# Patient Record
Sex: MALE | Race: BLACK OR AFRICAN AMERICAN | NOT HISPANIC OR LATINO | Employment: UNEMPLOYED | URBAN - METROPOLITAN AREA
[De-identification: names, ages, dates, MRNs, and addresses within clinical notes are randomized per-mention and may not be internally consistent; named-entity substitution may affect disease eponyms.]

---

## 2018-05-02 ENCOUNTER — HOSPITAL ENCOUNTER (OUTPATIENT)
Dept: MRI IMAGING | Facility: CLINIC | Age: 78
Discharge: HOME | End: 2018-05-02
Attending: OTOLARYNGOLOGY | Admitting: OTOLARYNGOLOGY
Payer: COMMERCIAL

## 2018-05-02 DIAGNOSIS — H90.3 ASNHL (ASYMMETRICAL SENSORINEURAL HEARING LOSS): ICD-10-CM

## 2018-05-02 PROCEDURE — A9585 GADOBUTROL INJECTION: HCPCS | Performed by: OTOLARYNGOLOGY

## 2018-05-02 PROCEDURE — 70553 MRI BRAIN STEM W/O & W/DYE: CPT

## 2018-05-02 PROCEDURE — 25000128 H RX IP 250 OP 636: Performed by: OTOLARYNGOLOGY

## 2018-05-02 RX ORDER — GADOBUTROL 604.72 MG/ML
10 INJECTION INTRAVENOUS ONCE
Status: COMPLETED | OUTPATIENT
Start: 2018-05-02 | End: 2018-05-02

## 2018-05-02 RX ADMIN — GADOBUTROL 8.5 ML: 604.72 INJECTION INTRAVENOUS at 14:31

## 2020-03-14 ENCOUNTER — HOSPITAL ENCOUNTER (EMERGENCY)
Facility: CLINIC | Age: 80
Discharge: HOME | End: 2020-03-14
Attending: EMERGENCY MEDICINE | Admitting: EMERGENCY MEDICINE
Payer: COMMERCIAL

## 2020-03-14 ENCOUNTER — APPOINTMENT (OUTPATIENT)
Dept: CT IMAGING | Facility: CLINIC | Age: 80
End: 2020-03-14
Attending: EMERGENCY MEDICINE
Payer: COMMERCIAL

## 2020-03-14 VITALS
RESPIRATION RATE: 16 BRPM | DIASTOLIC BLOOD PRESSURE: 80 MMHG | OXYGEN SATURATION: 100 % | SYSTOLIC BLOOD PRESSURE: 145 MMHG | HEART RATE: 78 BPM | TEMPERATURE: 97.9 F | WEIGHT: 185 LBS

## 2020-03-14 DIAGNOSIS — L03.213 PRESEPTAL CELLULITIS OF RIGHT LOWER EYELID: ICD-10-CM

## 2020-03-14 DIAGNOSIS — H10.31 ACUTE CONJUNCTIVITIS OF RIGHT EYE, UNSPECIFIED ACUTE CONJUNCTIVITIS TYPE: ICD-10-CM

## 2020-03-14 LAB
CREAT SERPL-MCNC: 0.88 MG/DL (ref 0.66–1.25)
GFR SERPL CREATININE-BSD FRML MDRD: 82 ML/MIN/{1.73_M2}

## 2020-03-14 PROCEDURE — 25000125 ZZHC RX 250: Performed by: EMERGENCY MEDICINE

## 2020-03-14 PROCEDURE — 99285 EMERGENCY DEPT VISIT HI MDM: CPT | Mod: 25 | Performed by: EMERGENCY MEDICINE

## 2020-03-14 PROCEDURE — 99284 EMERGENCY DEPT VISIT MOD MDM: CPT | Mod: Z6 | Performed by: EMERGENCY MEDICINE

## 2020-03-14 PROCEDURE — 25000128 H RX IP 250 OP 636: Performed by: EMERGENCY MEDICINE

## 2020-03-14 PROCEDURE — 70487 CT MAXILLOFACIAL W/DYE: CPT

## 2020-03-14 PROCEDURE — 82565 ASSAY OF CREATININE: CPT | Performed by: EMERGENCY MEDICINE

## 2020-03-14 RX ORDER — ERYTHROMYCIN 5 MG/G
0.5 OINTMENT OPHTHALMIC 4 TIMES DAILY
Qty: 1 TUBE | Refills: 0 | Status: SHIPPED | OUTPATIENT
Start: 2020-03-14 | End: 2020-03-21

## 2020-03-14 RX ORDER — PROPARACAINE HYDROCHLORIDE 5 MG/ML
2 SOLUTION/ DROPS OPHTHALMIC ONCE
Status: COMPLETED | OUTPATIENT
Start: 2020-03-14 | End: 2020-03-14

## 2020-03-14 RX ORDER — IOPAMIDOL 755 MG/ML
100 INJECTION, SOLUTION INTRAVASCULAR ONCE
Status: COMPLETED | OUTPATIENT
Start: 2020-03-14 | End: 2020-03-14

## 2020-03-14 RX ADMIN — PROPARACAINE HYDROCHLORIDE 2 DROP: 5 SOLUTION/ DROPS OPHTHALMIC at 19:03

## 2020-03-14 RX ADMIN — SODIUM CHLORIDE 50 ML: 9 INJECTION, SOLUTION INTRAVENOUS at 20:35

## 2020-03-14 RX ADMIN — FLUORESCEIN SODIUM 1 STRIP: 1 STRIP OPHTHALMIC at 19:03

## 2020-03-14 RX ADMIN — IOPAMIDOL 80 ML: 755 INJECTION, SOLUTION INTRAVENOUS at 20:34

## 2020-03-14 ASSESSMENT — TONOMETRY
OD_IOP_MMHG: 13
OS_IOP_MMHG: 13

## 2020-03-14 NOTE — ED AVS SNAPSHOT
[facility], [place], Emergency Department  [address]  [facility] [place] [facility] [place] [place] [address]  Phone:  [phone]  Fax:  [phone]                                    Yenny Reynolds   MRN: [id number]    Department:  [facility], Emergency Department   Date of Visit:  3/14/2020           After Visit Summary Signature Page    I have received my discharge instructions, and my questions have been answered. I have discussed any challenges I see with this plan with the nurse or doctor.    ..........................................................................................................................................  Patient/Patient Representative Signature      ..........................................................................................................................................  Patient Representative Print Name and Relationship to Patient    ..................................................               ................................................  Date                                   Time    ..........................................................................................................................................  Reviewed by Signature/Title    ...................................................              ..............................................  Date                                               Time          22EPIC Rev 08/18

## 2020-03-14 NOTE — ED PROVIDER NOTES
"    [facility] EMERGENCY DEPARTMENT ([facility])     March 14, 2020    ED Provider Note  [facility]      History     Chief Complaint   Patient presents with     Facial Swelling     pain and swelling to right lower eye lid, reports drainage from lower lid, blurry vision, eye is reddened, denies globe pain     HPI Due to language barrier, an  was used during the history-taking and subsequent discussion (and for part of the physical exam) with this patient.      Yenny Reynolds is a 80 year old male who presents with eye redness and discharge. Patient reports he noticed a \"pimple\" under his right eye. Swelling worsened with increased redness in the right eye. Some yellow drainage. Also notes worsening vision in right eye. No trauma to the eye. Does not wear corrective lenses. No other systemic symptoms. Seen by PCP today who sent him to the ED for further management.     Past Medical History  Past Medical History:   Diagnosis Date     Diabetes (H)      Hyperlipidaemia      Hypertension      Past Surgical History:   Procedure Laterality Date     ESOPHAGOSCOPY, GASTROSCOPY, DUODENOSCOPY (EGD), COMBINED N/A 7/10/2015    Procedure: COMBINED ESOPHAGOSCOPY, GASTROSCOPY, DUODENOSCOPY (EGD), BIOPSY SINGLE OR MULTIPLE;  Surgeon: Anthony Jack MD;  Location: [facility]     NO HISTORY OF SURGERY       amoxicillin-clavulanate (AUGMENTIN) 875-125 MG tablet  erythromycin (ROMYCIN) 5 MG/GM ophthalmic ointment  ASPIRIN ADULT LOW STRENGTH PO  omeprazole (PRILOSEC) 40 MG capsule      No Known Allergies  Past medical history, past surgical history, medications, and allergies were reviewed with the patient. Additional pertinent items: None    Family History  History reviewed. No pertinent family history.  Family history was reviewed with the patient. Additional pertinent items: None    Social History  Social History     Tobacco Use     Smoking status: Current Every Day Smoker     Packs/day: 0.50 "     Types: Cigarettes     Smokeless tobacco: Never Used   Substance Use Topics     Alcohol use: No     Drug use: No      Social history was reviewed with the patient. Additional pertinent items: None    Review of Systems  A complete review of systems was performed with pertinent positives and negatives noted in the HPI, and all other systems negative.    Physical Exam   BP: (!) 158/83  Pulse: 76  Temp: 97.9  F (36.6  C)  Resp: 16  Weight: 83.9 kg (185 lb)  SpO2: 98 %  Physical Exam  Vitals signs and nursing note reviewed.   Constitutional:       General: He is not in acute distress.     Appearance: Normal appearance. He is not diaphoretic.   HENT:      Head: Normocephalic and atraumatic.      Right Ear: Tympanic membrane normal.      Left Ear: Tympanic membrane normal.      Nose: Nose normal. No congestion or rhinorrhea.      Mouth/Throat:      Mouth: Mucous membranes are moist.      Pharynx: Oropharynx is clear. No oropharyngeal exudate or posterior oropharyngeal erythema.   Eyes:      General: No visual field deficit or scleral icterus.        Right eye: Discharge (yellow-tim) and hordeolum (with significant swelling of the lower lid) present.      Intraocular pressure: Right eye pressure is 13 mmHg. Left eye pressure is 13 mmHg.      Extraocular Movements: Extraocular movements intact.      Conjunctiva/sclera:      Right eye: Right conjunctiva is injected.      Pupils: Pupils are equal, round, and reactive to light.      Right eye: No corneal abrasion or fluorescein uptake.      Left eye: No corneal abrasion or fluorescein uptake.      Comments: No tenderness to palpation of the globe. No proptosis.   Neck:      Musculoskeletal: Neck supple.   Cardiovascular:      Heart sounds: Normal heart sounds.   Pulmonary:      Effort: No respiratory distress.      Breath sounds: Normal breath sounds.   Abdominal:      General: Bowel sounds are normal.      Palpations: Abdomen is soft.      Tenderness: There is no abdominal  tenderness.   Musculoskeletal:         General: No tenderness.   Skin:     General: Skin is warm.      Findings: No rash.   Neurological:      Mental Status: He is alert.         ED Course      Procedures       Results for orders placed or performed during the hospital encounter of 03/14/20   CT Facial Bones with Contrast     Status: None    Narrative    CT SCAN OF THE FACE WITH CONTRAST 3/14/2020 8:52 PM     HISTORY: Eye and facial swelling. Evaluate for sinus and orbital  infection    TECHNIQUE:  Axial scans with sagittal and coronal reformations.  Radiation dose for this scan was reduced using automated exposure  control, adjustment of the mA and/or kV according to patient size, or  iterative reconstruction technique. 80 ml Isovue 370    COMPARISON: None.    FINDINGS: There is preseptal soft tissue swelling over the right  orbit. This extends in over the right cheek region. There is a small,  4 mm low dense fluid like collection in the inferior eyelid. This is  best seen on sagittal image 27. This may represent a small abscess.  Post septal structures appear normal. No evidence for any post septal  orbital abscess. The extraocular muscles are normal. The optic nerve  appears normal. The cavernous sinuses appear normal. Mild mucosal  thickening is seen in the frontal sinuses, ethmoid sinuses, and  maxillary sinuses.      Impression    IMPRESSION:  1. Preseptal soft tissue swelling over the right orbit.  2. Low dense fluid like collection in the lower eyelid. This could  represent a small abscess.  3. Mucosal thickening is present in the maxillary sinuses, frontal  sinuses, ethmoid sinuses, and sphenoid sinuses. No air-fluid levels  are seen.    STAR LAZARO MD   Creatinine     Status: None   Result Value Ref Range    Creatinine 0.88 0.66 - 1.25 mg/dL    GFR Estimate 82 >60 mL/min/[1.73_m2]    GFR Estimate If Black >90 >60 mL/min/[1.73_m2]     Medications   proparacaine (ALCAINE) 0.5 % ophthalmic solution 2 drop (2  drops Both Eyes Given by Other 3/14/20 1903)   fluorescein (FUL-YOVANNY) ophthalmic strip 1 strip (1 strip Right Eye Given by Other 3/14/20 1903)   iopamidol (ISOVUE-370) solution 100 mL (80 mLs Intravenous Given 3/14/20 2034)   sodium chloride 0.9 % bag 500mL for CT scan flush use (50 mLs Intravenous Given 3/14/20 2035)        Assessments & Plan (with Medical Decision Making)   Patient was seen and examined. Findings consistent with right lower eyelid stye, though there is concern for associated cellulitis. I did discuss the case with ophthalmology who evaluated the patient in the emergency department. Given significant vision changes in the right eye, recommended CT scan to rule out orbital cellulitis. This showed pre-septal cellulitis without any abnormality of the eye or post-septal area. Will discharge patient on Augmentin and erythromycin ophthalmic ointment. Close outpatient follow up with ophthalmology (they will call him Monday). Patient expressed understanding and agreement of plan. Discharged in stable condition.     I have reviewed the nursing notes. I have reviewed the findings, diagnosis, plan and need for follow up with the patient.    Discharge Medication List as of 3/14/2020  9:06 PM      START taking these medications    Details   amoxicillin-clavulanate (AUGMENTIN) 875-125 MG tablet Take 1 tablet by mouth 2 times daily, Disp-28 tablet,R-0, Local Print      erythromycin (ROMYCIN) 5 MG/GM ophthalmic ointment Place 0.5 inches into the right eye 4 times daily for 7 daysDisp-1 Tube,R-0Local Print             Final diagnoses:   Preseptal cellulitis of right lower eyelid   Acute conjunctivitis of right eye, unspecified acute conjunctivitis type       --  Leti Santiago DO   Emergency Medicine   [facility], [place], EMERGENCY DEPARTMENT  3/14/2020       Leti Santiago DO  03/14/20 [id number]

## 2020-03-15 NOTE — DISCHARGE INSTRUCTIONS
Please make an appointment to follow up with ophthalmology. They will call you on Monday to make an appointment. Return to emergency department sooner if symptoms become worse.

## 2020-03-15 NOTE — CONSULTS
"  OPHTHALMOLOGY CONSULT NOTE  03/14/20    Patient: Yenny Reynolds  Consulted by: ED  Reason for Consult: cellulitis    HISTORY OF PRESENTING ILLNESS:     Yenny Reynolds is a 80 year old male who presented with redness in the right eye and firmness under the right lower eyelid for the past week. Pt reports he initially developed a pimple on the lower eyelid which popped and the discharge went into his eye. Since then, he reports blurry vision. Pt denies pain with extraocular movement, diplopia. Per pt, his vision in right eye has always been worse than left eye but it has worsened since the \"pimple\" popped. He does not wear glasses or contacts. Pt denies any ocular hx. He has DM but states his sugars are under control.     Review of systems were otherwise negative except for that which has been stated above.      OCULAR/MEDICAL/SURGICAL HISTORIES:     Past Ocular History:  none    Past Medical History:   Diagnosis Date     Diabetes (H)      Hyperlipidaemia      Hypertension        Past Surgical History:   Procedure Laterality Date     ESOPHAGOSCOPY, GASTROSCOPY, DUODENOSCOPY (EGD), COMBINED N/A 7/10/2015    Procedure: COMBINED ESOPHAGOSCOPY, GASTROSCOPY, DUODENOSCOPY (EGD), BIOPSY SINGLE OR MULTIPLE;  Surgeon: Anthony Jack MD;  Location:  GI     NO HISTORY OF SURGERY         EXAMINATION:     Base Eye Exam     Visual Acuity       Right Left    Near sc 20/200 20/25          Tonometry (Tonopen, 6:56 PM)       Right Left    Pressure 12 13          Pupils       Pupils    Right PERRL    Left PERRL          Visual Fields       Left Right     Full Full          Extraocular Movement       Right Left     Full Full          Neuro/Psych     Oriented x3:  Yes    Mood/Affect:  Normal          Dilation     Both eyes:  1.0% Mydriacyl, 2.5% Mino Synephrine @ 6:56 PM            Slit Lamp and Fundus Exam     External Exam       Right Left    External edema and firm mass around the lower eyelid Normal          Slit Lamp Exam       " Right Left    Lids/Lashes lower eyelid erythema, edema, firm mass, , Chalazion Normal    Conjunctiva/Sclera sub conj hemmorhage nasally and inferiorly White and quiet    Cornea Clear Clear    Anterior Chamber Deep and quiet Deep and quiet    Iris Round and reactive Round and reactive    Lens 2-3+ PSC, 2+ NS 2+ NS    Vitreous Normal Normal          Fundus Exam       Right Left    Disc Normal Normal    C/D Ratio 0.75 0.65    Macula Normal, hazy view Normal    Vessels Normal, hazy view Normal    Periphery Normal, hazy view secondary to cataract Normal                Labs/Studies/Imaging Performed  none     ASSESSMENT/PLAN:     Yenny Reynolds is a 80 year old male who presents with chalazion and preseptal cellulitis  - given significant difference in vision between 2 eyes, cannot rule out post-septal cellulitis  - however, vision changes could be secondary to denser cataract in right eye than in left eye  - pt reports no pain with extraocular movements and there is no restriction with extraocular movement    Recommend CT orbit and facial bones to rule out post-septal cellulitis  - If preseptal cellulitis, recommend oral abx, warm compresses and erythromycin ointment TID in right eye. Follow up in clinic early next week  - if post septal cellulitis, pt will need admission to the hospital for IV abx      #Cataracts  - visually significant cataract (R>L)  - follow up as outpatient    It is our pleasure to participate in this patient's care and treatment. Please contact us with any further questions or concerns.    Discussed with Dr. Spike Rivera MD  Ophthalmology Resident, PGY-2

## 2020-03-16 ENCOUNTER — TELEPHONE (OUTPATIENT)
Dept: OPHTHALMOLOGY | Facility: CLINIC | Age: 80
End: 2020-03-16

## 2021-06-16 ENCOUNTER — MEDICAL CORRESPONDENCE (OUTPATIENT)
Dept: HEALTH INFORMATION MANAGEMENT | Facility: CLINIC | Age: 81
End: 2021-06-16

## 2021-06-30 ENCOUNTER — TRANSCRIBE ORDERS (OUTPATIENT)
Dept: OTHER | Age: 81
End: 2021-06-30

## 2021-06-30 DIAGNOSIS — E11.9 TYPE 2 DIABETES MELLITUS WITHOUT COMPLICATION, WITHOUT LONG-TERM CURRENT USE OF INSULIN (H): Primary | ICD-10-CM

## 2021-07-05 NOTE — TELEPHONE ENCOUNTER
FUTURE VISIT INFORMATION      FUTURE VISIT INFORMATION:    Date: 8/2/21    Time: 10:20a m    Location: [facility]  REFERRAL INFORMATION:    Referring provider:  Lisa Max     Referring providers clinic:  [facility]  Reason for visit/diagnosis  diabetic eye exam  RECORDS REQUESTED FROM:       Clinic name Comments Records Status Imaging Status   [facility] Ov/referral 6/16/21 Care Everywhere    [facility] eye Ov 3/14/20 EPIC

## 2021-08-02 ENCOUNTER — PRE VISIT (OUTPATIENT)
Dept: OPHTHALMOLOGY | Facility: CLINIC | Age: 81
End: 2021-08-02

## 2022-10-04 ENCOUNTER — TRANSFERRED RECORDS (OUTPATIENT)
Dept: HEALTH INFORMATION MANAGEMENT | Facility: CLINIC | Age: 82
End: 2022-10-04

## 2022-10-26 ENCOUNTER — LAB REQUISITION (OUTPATIENT)
Dept: LAB | Facility: CLINIC | Age: 82
End: 2022-10-26
Payer: COMMERCIAL

## 2022-10-26 DIAGNOSIS — R05.9 COUGH, UNSPECIFIED: ICD-10-CM

## 2022-10-31 LAB
SARS-COV-2 RNA RESP QL NAA+PROBE: NEGATIVE
SCANNED LAB RESULT: NORMAL

## 2022-11-02 ENCOUNTER — LAB REQUISITION (OUTPATIENT)
Dept: LAB | Facility: CLINIC | Age: 82
End: 2022-11-02
Payer: COMMERCIAL

## 2022-11-02 DIAGNOSIS — E11.9 TYPE 2 DIABETES MELLITUS WITHOUT COMPLICATIONS (H): ICD-10-CM

## 2022-11-02 DIAGNOSIS — D50.9 IRON DEFICIENCY ANEMIA, UNSPECIFIED: ICD-10-CM

## 2022-11-02 LAB
ALBUMIN SERPL BCG-MCNC: 3.9 G/DL (ref 3.5–5.2)
ALP SERPL-CCNC: 114 U/L (ref 40–129)
ALT SERPL W P-5'-P-CCNC: 17 U/L (ref 10–50)
ANION GAP SERPL CALCULATED.3IONS-SCNC: 14 MMOL/L (ref 7–15)
AST SERPL W P-5'-P-CCNC: 19 U/L (ref 10–50)
BILIRUB SERPL-MCNC: 0.2 MG/DL
BUN SERPL-MCNC: 12.3 MG/DL (ref 8–23)
CALCIUM SERPL-MCNC: 9.3 MG/DL (ref 8.8–10.2)
CHLORIDE SERPL-SCNC: 99 MMOL/L (ref 98–107)
CHOLEST SERPL-MCNC: 195 MG/DL
CREAT SERPL-MCNC: 0.81 MG/DL (ref 0.67–1.17)
DEPRECATED HCO3 PLAS-SCNC: 20 MMOL/L (ref 22–29)
FERRITIN SERPL-MCNC: 10 NG/ML (ref 31–409)
GFR SERPL CREATININE-BSD FRML MDRD: 89 ML/MIN/1.73M2
GLUCOSE SERPL-MCNC: 274 MG/DL (ref 70–99)
HDLC SERPL-MCNC: 44 MG/DL
LDLC SERPL CALC-MCNC: 92 MG/DL
NONHDLC SERPL-MCNC: 151 MG/DL
POTASSIUM SERPL-SCNC: 4.1 MMOL/L (ref 3.4–5.3)
PROT SERPL-MCNC: 7.3 G/DL (ref 6.4–8.3)
SODIUM SERPL-SCNC: 133 MMOL/L (ref 136–145)
TRIGL SERPL-MCNC: 293 MG/DL

## 2022-11-02 PROCEDURE — 82043 UR ALBUMIN QUANTITATIVE: CPT | Mod: ORL | Performed by: INTERNAL MEDICINE

## 2022-11-02 PROCEDURE — 82728 ASSAY OF FERRITIN: CPT | Mod: ORL | Performed by: INTERNAL MEDICINE

## 2022-11-02 PROCEDURE — 80053 COMPREHEN METABOLIC PANEL: CPT | Mod: ORL | Performed by: INTERNAL MEDICINE

## 2022-11-02 PROCEDURE — 80061 LIPID PANEL: CPT | Mod: ORL | Performed by: INTERNAL MEDICINE

## 2022-11-03 ENCOUNTER — TRANSCRIBE ORDERS (OUTPATIENT)
Dept: OTHER | Age: 82
End: 2022-11-03

## 2022-11-03 DIAGNOSIS — H26.9 CATARACT OF RIGHT EYE: Primary | ICD-10-CM

## 2022-11-03 LAB
CREAT UR-MCNC: 68.1 MG/DL
MICROALBUMIN UR-MCNC: 38.7 MG/L
MICROALBUMIN/CREAT UR: 56.83 MG/G CR (ref 0–17)

## 2022-12-21 ENCOUNTER — OFFICE VISIT (OUTPATIENT)
Dept: OPHTHALMOLOGY | Facility: CLINIC | Age: 82
End: 2022-12-21
Attending: OPHTHALMOLOGY
Payer: COMMERCIAL

## 2022-12-21 DIAGNOSIS — H25.9 SENILE CATARACT OF RIGHT EYE, UNSPECIFIED AGE-RELATED CATARACT TYPE: Primary | ICD-10-CM

## 2022-12-21 DIAGNOSIS — H25.13 NUCLEAR SCLEROSIS OF BOTH EYES: Primary | ICD-10-CM

## 2022-12-21 PROCEDURE — 76512 OPH US DX B-SCAN: CPT | Performed by: OPHTHALMOLOGY

## 2022-12-21 PROCEDURE — 99204 OFFICE O/P NEW MOD 45 MIN: CPT | Performed by: OPHTHALMOLOGY

## 2022-12-21 PROCEDURE — 92025 CPTRIZED CORNEAL TOPOGRAPHY: CPT | Performed by: OPHTHALMOLOGY

## 2022-12-21 PROCEDURE — G0463 HOSPITAL OUTPT CLINIC VISIT: HCPCS | Mod: 25

## 2022-12-21 PROCEDURE — 76519 ECHO EXAM OF EYE: CPT | Performed by: OPHTHALMOLOGY

## 2022-12-21 ASSESSMENT — CONF VISUAL FIELD
OD_SUPERIOR_NASAL_RESTRICTION: 0
OS_SUPERIOR_NASAL_RESTRICTION: 0
OS_INFERIOR_NASAL_RESTRICTION: 0
OS_NORMAL: 1
OD_INFERIOR_TEMPORAL_RESTRICTION: 0
OD_NORMAL: 1
OD_SUPERIOR_TEMPORAL_RESTRICTION: 0
METHOD: COUNTING FINGERS
OS_INFERIOR_TEMPORAL_RESTRICTION: 0
OD_INFERIOR_NASAL_RESTRICTION: 0
OS_SUPERIOR_TEMPORAL_RESTRICTION: 0

## 2022-12-21 ASSESSMENT — TONOMETRY
OD_IOP_MMHG: 15
IOP_METHOD: ICARE
OS_IOP_MMHG: 15

## 2022-12-21 ASSESSMENT — EXTERNAL EXAM - LEFT EYE: OS_EXAM: NORMAL

## 2022-12-21 ASSESSMENT — VISUAL ACUITY
OS_SC: 20/50
OS_PH_SC: 20/25
METHOD: SNELLEN - LINEAR
OS_PH_SC+: -2
OD_SC: 1'/200E
OS_SC+: -2

## 2022-12-21 ASSESSMENT — SLIT LAMP EXAM - LIDS: COMMENTS: NORMAL

## 2022-12-21 ASSESSMENT — CUP TO DISC RATIO: OS_RATIO: 0.65

## 2022-12-21 NOTE — NURSING NOTE
Chief Complaints and History of Present Illnesses   Patient presents with     Cataract     Chief Complaint(s) and History of Present Illness(es)     Cataract            Laterality: right eye    Associated symptoms: blurred vision    Duration: months          Comments    Pt has trouble seeing out of his RE.  This has been going on for 3 months.   Pt does not have any pain in his eye.  Both of his eyes are itchy.  No flashes or floaters in either eye.     DM2 BS: 146 today.   No results found for: A1C     JACQUI BAIG December 21, 2022 2:17 PM

## 2022-12-21 NOTE — PROGRESS NOTES
Chief complaint   Cataract consult      HPI    Yenny Reynolds 82 year old male       Chief Complaint(s) and History of Present Illness(es)     Cataract    In right eye.  Associated symptoms include blurred vision.  Duration of months.           Comments    Pt has trouble seeing out of his RE.  This has been going on for 3 months.   Pt does not have any pain in his eye.  Both of his eyes are itchy.  No flashes or floaters in either eye.     DM2 BS: 146 today.   No results found for: A1C     JACQUI CASTELAN [facility] December 21, 2022 2:17 PM                    Past ocular history   Prior eye surgery/laser/Trauma: No  CTL wearer:No  Glasses : -  Family Hx of eye disease: No    PMH     Past Medical History:   Diagnosis Date     Diabetes (H)      Hyperlipidaemia      Hypertension        PSH     Past Surgical History:   Procedure Laterality Date     ESOPHAGOSCOPY, GASTROSCOPY, DUODENOSCOPY (EGD), COMBINED N/A 7/10/2015    Procedure: COMBINED ESOPHAGOSCOPY, GASTROSCOPY, DUODENOSCOPY (EGD), BIOPSY SINGLE OR MULTIPLE;  Surgeon: Anthony Jack MD;  Location:  GI     NO HISTORY OF SURGERY         Meds     Current Outpatient Medications   Medication     amoxicillin-clavulanate (AUGMENTIN) 875-125 MG tablet     ASPIRIN ADULT LOW STRENGTH PO     omeprazole (PRILOSEC) 40 MG capsule     No current facility-administered medications for this visit.       Labs   -    Imaging   -    Drops Currently Taking   -    Assessment/Plan   # #Senile Cataracts   *Patient is having difficulty with daily activities due to visual impairment. Clearly told patient that cataract surgery is NOT needed if managing with current vision. Patient wishes to proceed ahead with surgery. Explained benefits alternatives and risks including but not limited to loss of vision, severe infection, retinal detachment, need for more surgery, visual disturbances etc...  Informed patient that reaching uncorrected vision aim (without glasses) after cataract surgery is  not certain. Made patient aware they may need glasses, laser vision correction or in worst case scenario, IOL exchange.      Dilates well  no PXF  no Flomax  no guttae    -Aim: distance OU  - dominant eye  -Previous refractive surgery status:no  -Corneal astigmatism<1        Follow up:  Oph:  Others:  No follow-ups on file.    Attending Physician Attestation:  Complete documentation of historical and exam elements from today's encounter can be found in the full encounter summary report (not reduplicated in this progress note).  I personally obtained the chief complaint(s) and history of present illness.  I confirmed and edited as necessary the review of systems, past medical/surgical history, family history, social history, and examination findings as documented by others; and I examined the patient myself.  I personally reviewed the relevant tests, images, and reports as documented above.  I formulated and edited as necessary the assessment and plan and discussed the findings and management plan with the patient and family. - Devan Thornton MD

## 2022-12-23 ENCOUNTER — TELEPHONE (OUTPATIENT)
Dept: OPHTHALMOLOGY | Facility: CLINIC | Age: 82
End: 2022-12-23

## 2022-12-23 PROBLEM — H25.13 NUCLEAR SCLEROSIS OF BOTH EYES: Status: ACTIVE | Noted: 2022-12-23

## 2022-12-23 NOTE — TELEPHONE ENCOUNTER
Called patient to schedule surgery with Dr. Thornton    Date of Surgery: 3/30,4/13    Location of surgery: [facility]    Pre-Op H&P: PCP    Post-Op Appt Date: 3/31,4/14,4/21,5/5    Surgery Packet Mailed: yes      Additional comments: Spoke with patient via , he is aware of above dates, will review packet and reach out with any questions           Anna C. Schoenecker on 12/23/2022 at 9:09 AM

## 2023-01-09 ENCOUNTER — TRANSFERRED RECORDS (OUTPATIENT)
Dept: HEALTH INFORMATION MANAGEMENT | Facility: CLINIC | Age: 83
End: 2023-01-09

## 2023-01-12 ENCOUNTER — ANCILLARY PROCEDURE (OUTPATIENT)
Dept: CARDIOLOGY | Facility: CLINIC | Age: 83
End: 2023-01-12
Attending: STUDENT IN AN ORGANIZED HEALTH CARE EDUCATION/TRAINING PROGRAM
Payer: COMMERCIAL

## 2023-01-12 PROCEDURE — 93226 XTRNL ECG REC<48 HR SCAN A/R: CPT

## 2023-01-12 NOTE — PROGRESS NOTES
"Per Dr. Max, patient to have 24 hr holter monitor placed.  Diagnosis: palpitations  Monitor placed: {YES / NO:565141::\"Yes\"}  Patient Instructed: {YES / NO:033529::\"Yes\"}  Patient verbalized understanding: {YES / NO:875904::\"Yes\"}  Holter # 3  Juan Ordonez      "

## 2023-01-18 ENCOUNTER — ANCILLARY ORDERS (OUTPATIENT)
Dept: CARDIOLOGY | Facility: CLINIC | Age: 83
End: 2023-01-18

## 2023-01-18 ENCOUNTER — TRANSCRIBE ORDERS (OUTPATIENT)
Dept: OTHER | Age: 83
End: 2023-01-18

## 2023-01-18 DIAGNOSIS — R00.2 PALPITATIONS: ICD-10-CM

## 2023-01-18 DIAGNOSIS — R00.2 PALPITATIONS: Primary | ICD-10-CM

## 2023-01-18 NOTE — TELEPHONE ENCOUNTER
RECORDS RECEIVED FROM:   DATE RECEIVED:   NOTES STATUS DETAILS   OFFICE NOTE from referring provider    Care Everywhere [facility]   OFFICE NOTE from other cardiologist    N/A    SUMMARY from hospital/ED   Care Everywhere 6[place]-21 [facility]   MEDICATION LIST   Internal    DIAGNOSTIC PROCEDURES     EKG   In process 11-28-22 Requested   Monitor Reports   In process Placed 1-12-23   IMAGING (DISC & REPORT)      Echo   N/A    Stress Tests   N/A    Cath   N/A    MRI/MRA   N/A    CT/CTA   In process 10-4-22     Action 1/18/23 [facility]  Fax: [phone]   Action Taken Requested:    CT Chest    EKG Strips   10-4-22    10-4-22. 9-10-22        Action 1/18/23 [facility]  Fax: [phone]   Action Taken Requested EKG 11-28-22    Sent second request for EKG 1/19

## 2023-01-20 ENCOUNTER — OFFICE VISIT (OUTPATIENT)
Dept: CARDIOLOGY | Facility: CLINIC | Age: 83
End: 2023-01-20
Attending: STUDENT IN AN ORGANIZED HEALTH CARE EDUCATION/TRAINING PROGRAM
Payer: COMMERCIAL

## 2023-01-20 ENCOUNTER — ANCILLARY PROCEDURE (OUTPATIENT)
Dept: CARDIOLOGY | Facility: CLINIC | Age: 83
End: 2023-01-20
Attending: REGISTERED NURSE
Payer: COMMERCIAL

## 2023-01-20 ENCOUNTER — PRE VISIT (OUTPATIENT)
Dept: CARDIOLOGY | Facility: CLINIC | Age: 83
End: 2023-01-20

## 2023-01-20 VITALS
BODY MASS INDEX: 28.19 KG/M2 | DIASTOLIC BLOOD PRESSURE: 92 MMHG | HEART RATE: 92 BPM | SYSTOLIC BLOOD PRESSURE: 167 MMHG | OXYGEN SATURATION: 99 % | HEIGHT: 67 IN | WEIGHT: 179.6 LBS

## 2023-01-20 DIAGNOSIS — R00.2 PALPITATIONS: ICD-10-CM

## 2023-01-20 LAB
ATRIAL RATE - MUSE: 88 BPM
DIASTOLIC BLOOD PRESSURE - MUSE: NORMAL MMHG
INTERPRETATION ECG - MUSE: NORMAL
P AXIS - MUSE: 63 DEGREES
PR INTERVAL - MUSE: 174 MS
QRS DURATION - MUSE: 86 MS
QT - MUSE: 376 MS
QTC - MUSE: 454 MS
R AXIS - MUSE: -3 DEGREES
SYSTOLIC BLOOD PRESSURE - MUSE: NORMAL MMHG
T AXIS - MUSE: 71 DEGREES
VENTRICULAR RATE- MUSE: 88 BPM

## 2023-01-20 PROCEDURE — G0463 HOSPITAL OUTPT CLINIC VISIT: HCPCS | Mod: 25 | Performed by: STUDENT IN AN ORGANIZED HEALTH CARE EDUCATION/TRAINING PROGRAM

## 2023-01-20 PROCEDURE — 93225 XTRNL ECG REC<48 HRS REC: CPT

## 2023-01-20 PROCEDURE — 93005 ELECTROCARDIOGRAM TRACING: CPT

## 2023-01-20 PROCEDURE — G0463 HOSPITAL OUTPT CLINIC VISIT: HCPCS | Mod: 25

## 2023-01-20 PROCEDURE — 93227 XTRNL ECG REC<48 HR R&I: CPT | Performed by: INTERNAL MEDICINE

## 2023-01-20 PROCEDURE — 99205 OFFICE O/P NEW HI 60 MIN: CPT | Mod: 25 | Performed by: STUDENT IN AN ORGANIZED HEALTH CARE EDUCATION/TRAINING PROGRAM

## 2023-01-20 PROCEDURE — G0463 HOSPITAL OUTPT CLINIC VISIT: HCPCS | Performed by: STUDENT IN AN ORGANIZED HEALTH CARE EDUCATION/TRAINING PROGRAM

## 2023-01-20 ASSESSMENT — PAIN SCALES - GENERAL: PAINLEVEL: NO PAIN (0)

## 2023-01-20 NOTE — LETTER
1/20/2023      RE: Yenny Reynolds  [address]  [place] [address]       Dear Colleague,    Thank you for the opportunity to participate in the care of your patient, Yenny Reynolds, at the [facility] [place] [facility]. Please see a copy of my visit note below.    Chief complaint: Palpitations    HPI:   Yenny Reynolds is a 83 year old male with history of HTN, HLD who presents for evaluation of palpitations.      He reports intermittent episodes, couple times a day, for the past year or two, not able to describe a pattern but thinks it is improved at this time. He did have testing ordered but it was not completed at this time. We dicussed that will be important to complete these tests.     Patient denies chest pain.      PAST MEDICAL HISTORY:  Past Medical History:   Diagnosis Date     Diabetes (H)      Hyperlipidaemia      Hypertension        CURRENT MEDICATIONS:  Current Outpatient Medications   Medication Sig Dispense Refill     amoxicillin-clavulanate (AUGMENTIN) 875-125 MG tablet Take 1 tablet by mouth 2 times daily 28 tablet 0     ASPIRIN ADULT LOW STRENGTH PO        omeprazole (PRILOSEC) 40 MG capsule Take 1 capsule (40 mg) by mouth daily Take 30-60 minutes before a meal. 90 capsule 1       PAST SURGICAL HISTORY:  Past Surgical History:   Procedure Laterality Date     ESOPHAGOSCOPY, GASTROSCOPY, DUODENOSCOPY (EGD), COMBINED N/A 7/10/2015    Procedure: COMBINED ESOPHAGOSCOPY, GASTROSCOPY, DUODENOSCOPY (EGD), BIOPSY SINGLE OR MULTIPLE;  Surgeon: Anthony Jack MD;  Location:  GI     NO HISTORY OF SURGERY         ALLERGIES:   No Known Allergies    FAMILY HISTORY:  No family history of premature CAD or sudden death.    SOCIAL HISTORY:  Social History     Tobacco Use     Smoking status: Every Day     Packs/day: 0.50     Types: Cigarettes     Smokeless tobacco: Never   Substance Use Topics     Alcohol use: No     Drug  "use: No       ROS:   A comprehensive 14 point review of systems is negative other than as mentioned in HPI.    Exam:  BP (!) 167/92 (BP Location: Right arm, Patient Position: Sitting, Cuff Size: Adult Regular)   Pulse 92   Ht 1.708 m (5' 7.24\")   Wt 81.5 kg (179 lb 9.6 oz)   SpO2 99%   BMI 27.93 kg/m    GENERAL APPEARANCE: healthy, alert and no distress  EYES: no icterus, no xanthelasmas  NECK: JVP not elevated  RESPIRATORY: lungs clear to auscultation - no rales, rhonchi or wheezes, no use of accessory muscles, no retractions, respirations are unlabored, normal respiratory rate  CARDIOVASCULAR: regular rhythm, normal S1 with physiologic split S2, no S3 or S4 and no murmur, click or rub.  GI: soft, non tender, bowel sounds normal,no abdominal bruits  EXTREMITIES: no edema, no bruits  NEURO: alert and oriented to person/place/time, normal speech, gait and affect  PSYCH: cooperative, affect appropriate.     Labs:  Reviewed.   LDL Cholesterol Calculated   Date Value Ref Range Status   11/02/2022 92 <=100 mg/dL Final   02/05/2009  0 - 129 mg/dL Final    Cannot estimate LDL when triglyceride exceeds 400 mg/dL       ECG today HR 88 bpm, NSR, septal infarct.     Assessment and Plan:   Yenny Reynolds is a 83 year old male with history of HTN, HLD who presents for evaluation of palpitations.     Palpitations, will need to further evaluate the etiology with ECG monitor. No significant arrhythmia on ECG today.  - Holter monitor  - Echocardiogram    Chart review time: 30 minutes  Visit time: 30 minutes  Total time spent: 60 minutes  >50% of this 30-minute visit were spent with the patient on in-person counseling and discussion regarding papitations.     The patient states understanding and is agreeable with plan.     Delbert Escalera MD  Cardiology    [facility]  [facility]        "

## 2023-01-20 NOTE — PROGRESS NOTES
"Chief complaint: Palpitations    HPI:   Yenny Reynolds is a 83 year old male with history of HTN, HLD who presents for evaluation of palpitations.      He reports intermittent episodes, couple times a day, for the past year or two, not able to describe a pattern but thinks it is improved at this time. He did have testing ordered but it was not completed at this time. We dicussed that will be important to complete these tests.     Patient denies chest pain.      PAST MEDICAL HISTORY:  Past Medical History:   Diagnosis Date     Diabetes (H)      Hyperlipidaemia      Hypertension        CURRENT MEDICATIONS:  Current Outpatient Medications   Medication Sig Dispense Refill     amoxicillin-clavulanate (AUGMENTIN) 875-125 MG tablet Take 1 tablet by mouth 2 times daily 28 tablet 0     ASPIRIN ADULT LOW STRENGTH PO        omeprazole (PRILOSEC) 40 MG capsule Take 1 capsule (40 mg) by mouth daily Take 30-60 minutes before a meal. 90 capsule 1       PAST SURGICAL HISTORY:  Past Surgical History:   Procedure Laterality Date     ESOPHAGOSCOPY, GASTROSCOPY, DUODENOSCOPY (EGD), COMBINED N/A 7/10/2015    Procedure: COMBINED ESOPHAGOSCOPY, GASTROSCOPY, DUODENOSCOPY (EGD), BIOPSY SINGLE OR MULTIPLE;  Surgeon: Anthony Jack MD;  Location:  GI     NO HISTORY OF SURGERY         ALLERGIES:   No Known Allergies    FAMILY HISTORY:  No family history of premature CAD or sudden death.    SOCIAL HISTORY:  Social History     Tobacco Use     Smoking status: Every Day     Packs/day: 0.50     Types: Cigarettes     Smokeless tobacco: Never   Substance Use Topics     Alcohol use: No     Drug use: No       ROS:   A comprehensive 14 point review of systems is negative other than as mentioned in HPI.    Exam:  BP (!) 167/92 (BP Location: Right arm, Patient Position: Sitting, Cuff Size: Adult Regular)   Pulse 92   Ht 1.708 m (5' 7.24\")   Wt 81.5 kg (179 lb 9.6 oz)   SpO2 99%   BMI 27.93 kg/m    GENERAL APPEARANCE: healthy, alert and no " distress  EYES: no icterus, no xanthelasmas  NECK: JVP not elevated  RESPIRATORY: lungs clear to auscultation - no rales, rhonchi or wheezes, no use of accessory muscles, no retractions, respirations are unlabored, normal respiratory rate  CARDIOVASCULAR: regular rhythm, normal S1 with physiologic split S2, no S3 or S4 and no murmur, click or rub.  GI: soft, non tender, bowel sounds normal,no abdominal bruits  EXTREMITIES: no edema, no bruits  NEURO: alert and oriented to person/place/time, normal speech, gait and affect  PSYCH: cooperative, affect appropriate.     Labs:  Reviewed.   LDL Cholesterol Calculated   Date Value Ref Range Status   11/02/2022 92 <=100 mg/dL Final   02/05/2009  0 - 129 mg/dL Final    Cannot estimate LDL when triglyceride exceeds 400 mg/dL       ECG today HR 88 bpm, NSR, septal infarct.     Assessment and Plan:   Yenny Reynolds is a 83 year old male with history of HTN, HLD who presents for evaluation of palpitations.     Palpitations, will need to further evaluate the etiology with ECG monitor. No significant arrhythmia on ECG today.  - Holter monitor  - Echocardiogram    Chart review time: 30 minutes  Visit time: 30 minutes  Total time spent: 60 minutes  >50% of this 30-minute visit were spent with the patient on in-person counseling and discussion regarding papitations.     The patient states understanding and is agreeable with plan.     Delbert Escalera MD  Cardiology    CC  [facility]

## 2023-01-20 NOTE — PATIENT INSTRUCTIONS
January 20, 2023    Cardiology Provider You Saw During Your Visit: Dr. Escalera      Medication Changes: None      Follow Up:   -Holter monitor placed today  -Echocardiogram after holter monitor is off  -Follow up with Dr. Escalera in 1 month to review results          Follow the American Heart Association Diet and Lifestyle Recommendations:  -Limit saturated fat, trans fat, sodium, red meat, sweets and sugar-sweetened beverages. If you choose to eat red meat, compare labels and select the leanest cuts available.  -Aim for at least 150 minutes of moderate physical activity or 75 minutes of vigorous physical activity - or an equal combination of both - each week.      To Reach Us:  -During business hours: [phone], press option # 1 to schedule an appointment or to leave a message for your care team.     -After hours, weekends or holidays: [phone], press option #4 and ask to speak to the on-call cardiologist. Inform them you are a patient at the [facility].      Evelina Garcia RN  Cardiology Care Coordinator - General Cardiology  [facility]

## 2023-01-20 NOTE — PROGRESS NOTES
Per Dr. Delmy Linares, patient to have 24 hour holter monitor placed.  Diagnosis: Palpitation, R00.2  Monitor placed: Yes  Patient Instructed: Yes  Patient verbalized understanding: Yes  Holter # 15    Monitor placed by Everardo Carey CMA  4:20 PM

## 2023-01-20 NOTE — NURSING NOTE
January 20, 2023    Medication Changes: None      Follow Up:   -Holter monitor placed today  -Echocardiogram after holter monitor is off  -Follow up with Dr. Escalera in 1 month to review results    Patient verbalized understanding of all health information given and agreed to call with further questions or concerns.      Evelina Garcia RN

## 2023-01-23 ENCOUNTER — APPOINTMENT (OUTPATIENT)
Dept: INTERPRETER SERVICES | Facility: CLINIC | Age: 83
End: 2023-01-23
Payer: COMMERCIAL

## 2023-03-13 ENCOUNTER — TRANSFERRED RECORDS (OUTPATIENT)
Dept: HEALTH INFORMATION MANAGEMENT | Facility: CLINIC | Age: 83
End: 2023-03-13

## 2023-03-13 ENCOUNTER — LAB REQUISITION (OUTPATIENT)
Dept: LAB | Facility: CLINIC | Age: 83
End: 2023-03-13
Payer: COMMERCIAL

## 2023-03-13 DIAGNOSIS — D50.9 IRON DEFICIENCY ANEMIA, UNSPECIFIED: ICD-10-CM

## 2023-03-13 LAB — FERRITIN SERPL-MCNC: 9 NG/ML (ref 31–409)

## 2023-03-13 PROCEDURE — 82728 ASSAY OF FERRITIN: CPT | Mod: ORL | Performed by: REGISTERED NURSE

## 2023-03-22 ENCOUNTER — TELEPHONE (OUTPATIENT)
Dept: OPHTHALMOLOGY | Facility: CLINIC | Age: 83
End: 2023-03-22
Payer: COMMERCIAL

## 2023-03-23 ENCOUNTER — APPOINTMENT (OUTPATIENT)
Dept: INTERPRETER SERVICES | Facility: CLINIC | Age: 83
End: 2023-03-23
Payer: COMMERCIAL

## 2023-03-28 ENCOUNTER — ALLIED HEALTH/NURSE VISIT (OUTPATIENT)
Dept: OPHTHALMOLOGY | Facility: CLINIC | Age: 83
End: 2023-03-28
Attending: OPHTHALMOLOGY
Payer: COMMERCIAL

## 2023-03-28 ENCOUNTER — TELEPHONE (OUTPATIENT)
Dept: OPHTHALMOLOGY | Facility: CLINIC | Age: 83
End: 2023-03-28
Payer: COMMERCIAL

## 2023-03-28 DIAGNOSIS — H25.9 SENILE CATARACT OF RIGHT EYE, UNSPECIFIED AGE-RELATED CATARACT TYPE: Primary | ICD-10-CM

## 2023-03-28 PROCEDURE — 99207 PR NO BILLABLE SERVICE THIS VISIT: CPT | Performed by: OPHTHALMOLOGY

## 2023-03-28 NOTE — TELEPHONE ENCOUNTER
Spoke with patient and scheduled him a tech visit to get testing done. Patient went to wrong clinic let him know to try and get here before 5pm said he will try.    Afia Hobbs, Cornea Facilitator on 3/28/2023 at 4:17 PM

## 2023-03-29 ENCOUNTER — ANESTHESIA EVENT (OUTPATIENT)
Dept: SURGERY | Facility: AMBULATORY SURGERY CENTER | Age: 83
End: 2023-03-29
Payer: COMMERCIAL

## 2023-03-30 ENCOUNTER — ANESTHESIA (OUTPATIENT)
Dept: SURGERY | Facility: AMBULATORY SURGERY CENTER | Age: 83
End: 2023-03-30
Payer: COMMERCIAL

## 2023-03-30 ENCOUNTER — HOSPITAL ENCOUNTER (OUTPATIENT)
Facility: AMBULATORY SURGERY CENTER | Age: 83
Discharge: HOME | End: 2023-03-30
Attending: OPHTHALMOLOGY
Payer: COMMERCIAL

## 2023-03-30 VITALS
BODY MASS INDEX: 28.37 KG/M2 | SYSTOLIC BLOOD PRESSURE: 141 MMHG | OXYGEN SATURATION: 100 % | TEMPERATURE: 97.2 F | DIASTOLIC BLOOD PRESSURE: 80 MMHG | WEIGHT: 180.78 LBS | RESPIRATION RATE: 16 BRPM | HEIGHT: 67 IN

## 2023-03-30 DIAGNOSIS — H25.13 NUCLEAR SCLEROSIS OF BOTH EYES: ICD-10-CM

## 2023-03-30 LAB — GLUCOSE BLDC GLUCOMTR-MCNC: 185 MG/DL (ref 70–99)

## 2023-03-30 PROCEDURE — 66982 XCAPSL CTRC RMVL CPLX WO ECP: CPT | Mod: RT

## 2023-03-30 PROCEDURE — 66982 XCAPSL CTRC RMVL CPLX WO ECP: CPT | Mod: RT | Performed by: OPHTHALMOLOGY

## 2023-03-30 PROCEDURE — 82962 GLUCOSE BLOOD TEST: CPT | Performed by: PATHOLOGY

## 2023-03-30 DEVICE — LENS CC60WF 22.5 CLAREON UV ASPHERIC BICONVEX IOL: Type: IMPLANTABLE DEVICE | Site: EYE | Status: FUNCTIONAL

## 2023-03-30 RX ORDER — BALANCED SALT SOLUTION 6.4; .75; .48; .3; 3.9; 1.7 MG/ML; MG/ML; MG/ML; MG/ML; MG/ML; MG/ML
SOLUTION OPHTHALMIC PRN
Status: DISCONTINUED | OUTPATIENT
Start: 2023-03-30 | End: 2023-03-30 | Stop reason: HOSPADM

## 2023-03-30 RX ORDER — AMLODIPINE BESYLATE 10 MG/1
10 TABLET ORAL DAILY
COMMUNITY
Start: 2022-09-16

## 2023-03-30 RX ORDER — FENTANYL CITRATE 50 UG/ML
50 INJECTION, SOLUTION INTRAMUSCULAR; INTRAVENOUS EVERY 5 MIN PRN
Status: DISCONTINUED | OUTPATIENT
Start: 2023-03-30 | End: 2023-03-31 | Stop reason: HOSPADM

## 2023-03-30 RX ORDER — ATORVASTATIN CALCIUM 40 MG/1
TABLET, FILM COATED ORAL
COMMUNITY
Start: 2023-03-10

## 2023-03-30 RX ORDER — TRIAMCINOLONE ACETONIDE 40 MG/ML
INJECTION, SUSPENSION INTRA-ARTICULAR; INTRAMUSCULAR PRN
Status: DISCONTINUED | OUTPATIENT
Start: 2023-03-30 | End: 2023-03-30 | Stop reason: HOSPADM

## 2023-03-30 RX ORDER — LIDOCAINE 40 MG/G
CREAM TOPICAL
Status: DISCONTINUED | OUTPATIENT
Start: 2023-03-30 | End: 2023-03-31 | Stop reason: HOSPADM

## 2023-03-30 RX ORDER — SODIUM CHLORIDE, SODIUM LACTATE, POTASSIUM CHLORIDE, CALCIUM CHLORIDE 600; 310; 30; 20 MG/100ML; MG/100ML; MG/100ML; MG/100ML
INJECTION, SOLUTION INTRAVENOUS CONTINUOUS
Status: DISCONTINUED | OUTPATIENT
Start: 2023-03-30 | End: 2023-03-31 | Stop reason: HOSPADM

## 2023-03-30 RX ORDER — ONDANSETRON 4 MG/1
4 TABLET, ORALLY DISINTEGRATING ORAL EVERY 30 MIN PRN
Status: DISCONTINUED | OUTPATIENT
Start: 2023-03-30 | End: 2023-03-31 | Stop reason: HOSPADM

## 2023-03-30 RX ORDER — ACETAMINOPHEN 325 MG/1
975 TABLET ORAL ONCE
Status: COMPLETED | OUTPATIENT
Start: 2023-03-30 | End: 2023-03-30

## 2023-03-30 RX ORDER — FENTANYL CITRATE 50 UG/ML
25 INJECTION, SOLUTION INTRAMUSCULAR; INTRAVENOUS EVERY 5 MIN PRN
Status: DISCONTINUED | OUTPATIENT
Start: 2023-03-30 | End: 2023-03-31 | Stop reason: HOSPADM

## 2023-03-30 RX ORDER — HYDROMORPHONE HYDROCHLORIDE 1 MG/ML
0.2 INJECTION, SOLUTION INTRAMUSCULAR; INTRAVENOUS; SUBCUTANEOUS EVERY 5 MIN PRN
Status: DISCONTINUED | OUTPATIENT
Start: 2023-03-30 | End: 2023-03-31 | Stop reason: HOSPADM

## 2023-03-30 RX ORDER — HYDROMORPHONE HYDROCHLORIDE 1 MG/ML
0.4 INJECTION, SOLUTION INTRAMUSCULAR; INTRAVENOUS; SUBCUTANEOUS EVERY 5 MIN PRN
Status: DISCONTINUED | OUTPATIENT
Start: 2023-03-30 | End: 2023-03-31 | Stop reason: HOSPADM

## 2023-03-30 RX ORDER — LIDOCAINE HYDROCHLORIDE 10 MG/ML
INJECTION, SOLUTION EPIDURAL; INFILTRATION; INTRACAUDAL; PERINEURAL PRN
Status: DISCONTINUED | OUTPATIENT
Start: 2023-03-30 | End: 2023-03-30 | Stop reason: HOSPADM

## 2023-03-30 RX ORDER — PROPARACAINE HYDROCHLORIDE 5 MG/ML
1 SOLUTION/ DROPS OPHTHALMIC ONCE
Status: COMPLETED | OUTPATIENT
Start: 2023-03-30 | End: 2023-03-30

## 2023-03-30 RX ORDER — FENTANYL CITRATE 50 UG/ML
INJECTION, SOLUTION INTRAMUSCULAR; INTRAVENOUS PRN
Status: DISCONTINUED | OUTPATIENT
Start: 2023-03-30 | End: 2023-03-30

## 2023-03-30 RX ORDER — ONDANSETRON 2 MG/ML
4 INJECTION INTRAMUSCULAR; INTRAVENOUS EVERY 30 MIN PRN
Status: DISCONTINUED | OUTPATIENT
Start: 2023-03-30 | End: 2023-03-31 | Stop reason: HOSPADM

## 2023-03-30 RX ORDER — CYCLOPENTOLAT/TROPIC/PHENYLEPH 1%-1%-2.5%
1 DROPS (EA) OPHTHALMIC (EYE)
Status: DISCONTINUED | OUTPATIENT
Start: 2023-03-30 | End: 2023-03-31 | Stop reason: HOSPADM

## 2023-03-30 RX ORDER — MOXIFLOXACIN IN NACL,ISO-OS/PF 0.3MG/0.3
SYRINGE (ML) INTRAOCULAR PRN
Status: DISCONTINUED | OUTPATIENT
Start: 2023-03-30 | End: 2023-03-30 | Stop reason: HOSPADM

## 2023-03-30 RX ORDER — TETRACAINE HYDROCHLORIDE 5 MG/ML
SOLUTION OPHTHALMIC PRN
Status: DISCONTINUED | OUTPATIENT
Start: 2023-03-30 | End: 2023-03-30 | Stop reason: HOSPADM

## 2023-03-30 RX ORDER — AMLODIPINE BESYLATE 10 MG/1
TABLET ORAL
COMMUNITY
Start: 2022-12-01

## 2023-03-30 RX ORDER — DICLOFENAC SODIUM 1 MG/ML
1 SOLUTION/ DROPS OPHTHALMIC
Status: DISCONTINUED | OUTPATIENT
Start: 2023-03-30 | End: 2023-03-31 | Stop reason: HOSPADM

## 2023-03-30 RX ADMIN — SODIUM CHLORIDE, SODIUM LACTATE, POTASSIUM CHLORIDE, CALCIUM CHLORIDE: 600; 310; 30; 20 INJECTION, SOLUTION INTRAVENOUS at 13:44

## 2023-03-30 RX ADMIN — DICLOFENAC SODIUM 1 DROP: 1 SOLUTION/ DROPS OPHTHALMIC at 13:48

## 2023-03-30 RX ADMIN — ACETAMINOPHEN 975 MG: 325 TABLET ORAL at 13:43

## 2023-03-30 RX ADMIN — DICLOFENAC SODIUM 1 DROP: 1 SOLUTION/ DROPS OPHTHALMIC at 13:45

## 2023-03-30 RX ADMIN — FENTANYL CITRATE 25 MCG: 50 INJECTION, SOLUTION INTRAMUSCULAR; INTRAVENOUS at 14:00

## 2023-03-30 RX ADMIN — PROPARACAINE HYDROCHLORIDE 1 DROP: 5 SOLUTION/ DROPS OPHTHALMIC at 13:46

## 2023-03-30 NOTE — OP NOTE
Operative  Report    Patient Name: Yenny Reynolds    MRN: [id number]    Date of Surgery: 3/30/2023    Surgeon: kaushal roblero M.D    Assistant surgeon: arnulfo campuzano md    Preoperative Diagnosis: Visually significant cataract, right eye, white cataract and small non dilating pupil    Postoperative Diagnosis: Same    Procedure: complex phacoemulsification with intraocular lens implant, right eye    Implant: AIM distance  Implant Name Type Inv. Item Serial No.  Lot No. LRB No. Used Action   LENS [id number] [employer] UV ASPHERIC BICONVEX IOL - [id number] Lens/Eye Implant LENS [id number] CL[employer] UV ASPHERIC BICONVEX IOL [id number] [employer]  Right 1 Implanted       Anesthesia Type: mac/topical    Estimated Blood Loss: Trace    Complications: None    INDICATIONS FOR PROCEDURE: Patient has a history of visually significant cataract affecting the patient's activity of daily living. After a discussion with the patient, the patient has elected to have the listed procedure(s) to maximize visual potential. All of the appropriate consent forms have been signed and all of the patient's questions have been answered.    DETAILS OF THE PROCEDURE: Patient was identified in the pre operative area and given pre operative eye drops. The patient was then brought into the operating room and intravenous sedation was begun after a time out was performed. The patient was prepped and draped in the usual sterile ophthalmic fashion.     A lid speculum was placed and the operating microscope was brought into position. A paracentesis was made and lidocaine was injected into the anterior chamber. Given poor visualization due to white cataract, trypan blue was injected into the anterior chamber, allowed to stain the capsule and irrigated out with balanced salt solution. Viscoelastic was injected into the anterior chamber. Given poor dilation, a 7.0mm Malyugin ring was inserted to  expand the pupil as it failed to dilate. A clear corneal incision was made using a keratome blade. A cystotome needle and Utrata forceps were used to create an anterior continuous curvilinear capsulorhexis. Then BSS on a blunt tipped cannula was used for hydrodissection and hydrodelineation. Using the phacoemulsification unit, the nucleus was then removed from the eye. Using irrigation and aspiration, the remaining cortical material was removed from the eye. The capsular bag was infused with viscoelastic material. The intraocular lens was then placed into the capsular bag and secured into position. The Malyugin ring was removed. The remaining viscoelastic material was then removed from the eye via irrigation and aspiration. BSS on a blunt tipped cannula was used to hydrate all of the wounds. At the end of the case, the wounds were noted to be watertight, the pupil was noted to be round, the lens appeared to be in good position, and the pressure was palpated to be physiologic. Intracameral moxifloxacin and A subconjunctival injection of Kenalog was administered.     Post operative ointment was applied and the eye was patched and shielded. Patient tolerated procedure and was brought to the recover area in good condition. Patient will follow in the eye clinic in one day.     Dr. Devan Thornton was present and scrubbed for the entire procedure.    Kendall Roger MD  Fellow, Cornea, External Disease and Refractive Surgery  Department of Ophthalmology  [facility]

## 2023-03-30 NOTE — OR NURSING
Pt late for surgery. RN unable to give all preop eye drops. Per MD this is ok-pt marked ready and brought back to OR.

## 2023-03-30 NOTE — DISCHARGE INSTRUCTIONS
Post-Operative Instructions     FIRST 24 HOURS AFTER SURGERY:  You are allowed to Tylenol (acetaminophen) 650mg every four hours as needed for pain unless you have liver disease or are allergic to Tylenol..  Continue taking your regular medications and eye drops in the non-operative eye.  Do not remove the metal or plastic shield unless otherwise instructed by your surgeon.  Do not operate a car, motorcycle, or machinery for 24 hours after surgery.  Call ED immediately if you have SEVERE PAIN unrelieved with Tylenol. And  ask for the resident on call.     MEDICATION INSTRUCTIONS:  -You will not have treatment eye drops prescription as medications were injected into your eye.  -If you feel your eyes are dry and itchy, you can use regular lubricating drops for one month after the surgery. These eye drops can be found over the counter Brand names example: Systane, Refresh. Please choose preservative free drops. To be used four times a day and as needed for 1 month  -Instilling the eye drops directly into the eye.    -If you had previously any glaucoma eye drops, You can remove the cover and instill the drops as prescribed before and after the procedure      GENERAL INSTRUCTIONS:  Hygiene of the operated eye  Wash your hands thoroughly before caring for the eye.  If the lids are sticky or itchy in the morning, debris, or matter can be gently wiped away with a cotton ball moistened with tap water. DO NOT press on the lids or eyeball.     FIVE MINUTES APART. If ointment is prescribed, it should be applied last.  If you have been taking medications in the non-operated eye, continue as they were prescribed.  If you take medications by mouth for a medical problem, continue as they were prescribed (unless otherwise instructed by physician)    EYE PROTECTION  From the time of surgery until the time of your post-operative day 1 appointment, wear the eye shield at all times. Then, wear it whenever sleeping, for 1  week.  Protective sunglasses may be worn as needed for your comfort, and are suggested for outdoor activities.    ACTIVITIES  Avoid vigorous exertion and heavy lifting for the first week after surgery  You may take a bath or shower, but avoid getting water directly into your eye for one week after surgery, usually by keeping your eyes closed during the bath.  You should discuss driving and traveling with your surgeon. You may ride in a car and fly in an airplane unless otherwise instructed.  Avoid swimming or using a hot tube/sauna/pool/lake for 2 weeks after the surgery.      WHAT TO EXPECT:  Mild irritation and discomfort are normal.    Call the doctor if you experience any of the following:  Severe eye pain  Nausea  Vomiting  Severe headache      [facility]  Home Care Following Anesthesia  For 24 hours after surgery:  Get plenty of rest.  A responsible adult must stay with you for at least 24 hours after you leave the surgery center.  Do not drive or use heavy equipment.  If you have weakness or tingling, don't drive or use heavy equipment until this feeling goes away.   Do not drink alcohol.   Avoid strenuous or risky activities.  Ask for help when climbing stairs.  You may feel lightheaded.  IF so, sit for a few minutes before standing.  Have someone help you get up.   If you have nausea (feel sick to your stomach): Drink only clear liquids such as apple juice, ginger ale, broth or 7-Up.  Rest may also help.  Be sure to drink enough fluids.  Move to a regular diet as you feel able.   You may have a slight fever.  Call the doctor if your fever is over 100 F (37.7 C) (taken under the tongue) or lasts longer than 24 hours.  You may have a dry mouth, a sore throat, muscle aches or trouble sleeping. These should go away after 24 hours.  Do not make important or legal decisions.   It is recommended to avoid smoking.               Tips for taking pain medications  To get the best  pain relief possible, remember these points:  Take pain medications as directed, before pain becomes severe.  Pain medication can upset your stomach: taking it with food may help.  Constipation is a common side effect of pain medication. Drink plenty of  fluids.  Eat foods high in fiber. Take a stool softener if recommended by your doctor or pharmacist.  Do not drink alcohol, drive or operate machinery while taking pain medications.  Ask about other ways to control pain, such as with heat, ice or relaxation.    Tylenol/Acetaminophen Consumption  To help encourage the safe use of acetaminophen, the makers of TYLENOL  have lowered the maximum daily dose for single-ingredient Extra Strength TYLENOL  (acetaminophen) products sold in the [place] from 8 pills per day (4,000 mg) to 6 pills per day (3,000 mg). The dosing interval has also changed from 2 pills every 4-6 hours to 2 pills every 6 hours.  If you feel your pain relief is insufficient, you may take Tylenol/Acetaminophen in addition to your narcotic pain medication.   Be careful not to exceed 3,000 mg of Tylenol/Acetaminophen in a 24 hour period from all sources.  If you are taking extra strength Tylenol/acetaminophen (500 mg), the maximum dose is 6 tablets in 24 hours.  If you are taking regular strength acetaminophen (325 mg), the maximum dose is 9 tablets in 24 hours.    Call a doctor for any of the following:  Signs of infection (fever, growing tenderness at the surgery site, a large amount of drainage or bleeding, severe pain, foul-smelling drainage, redness, swelling).  It has been over 8 to 10 hours since surgery and you are still not able to urinate (pass water).  Headache for over 24 hours.  Numbness, tingling or weakness the day after surgery (if you had spinal anesthesia).  Signs of Covid-19 infection (temperature over 100 degrees, shortness of breath, cough, loss of taste/smell, generalized body aches, persistent headache, chills, sore throat,  nausea/vomiting/diarrhea)  Your doctor is:  Dr. Devan Thornton, Ophthalmology: [phone]                    Or dial [phone] and ask for the resident on call for:  Ophthalmology  For emergency care, call the:  [facility]:  [phone] (TTY for hearing impaired: [phone])

## 2023-03-30 NOTE — ANESTHESIA POSTPROCEDURE EVALUATION
Patient: Yenny Reynolds    Procedure: Procedure(s):  RIGHT EYE PHACOEMULSIFICATION, CATARACT, WITH STANDARD INTRAOCULAR LENS IMPLANT INSERTION, COMPLEX CASE       Anesthesia Type:  MAC    Note:  Disposition: Outpatient   Postop Pain Control: Uneventful            Sign Out: Well controlled pain   PONV: No   Neuro/Psych: Uneventful            Sign Out: Acceptable/Baseline neuro status   Airway/Respiratory: Uneventful            Sign Out: Acceptable/Baseline resp. status   CV/Hemodynamics: Uneventful            Sign Out: Acceptable CV status; No obvious hypovolemia; No obvious fluid overload   Other NRE: NONE   DID A NON-ROUTINE EVENT OCCUR? No           Last vitals:  Vitals Value Taken Time   /78 03/30/23 1435   Temp     Pulse     Resp 16 03/30/23 1435   SpO2 100 % 03/30/23 1435       Electronically Signed By: Ramy Middleton MD, MD  March 30, 2023  2:42 PM

## 2023-03-30 NOTE — ANESTHESIA PREPROCEDURE EVALUATION
Anesthesia Pre-Procedure Evaluation    Patient: Yenny Reynolds   MRN: [id number] : 1940        Procedure : Procedure(s):  RIGHT EYE PHACOEMULSIFICATION, CATARACT, WITH STANDARD INTRAOCULAR LENS IMPLANT INSERTION          Past Medical History:   Diagnosis Date     Diabetes (H)      Hyperlipidaemia      Hypertension       Past Surgical History:   Procedure Laterality Date     ESOPHAGOSCOPY, GASTROSCOPY, DUODENOSCOPY (EGD), COMBINED N/A 7/10/2015    Procedure: COMBINED ESOPHAGOSCOPY, GASTROSCOPY, DUODENOSCOPY (EGD), BIOPSY SINGLE OR MULTIPLE;  Surgeon: Anthony Jack MD;  Location:  GI     NO HISTORY OF SURGERY        No Known Allergies   Social History     Tobacco Use     Smoking status: Every Day     Packs/day: 0.50     Types: Cigarettes     Smokeless tobacco: Never   Substance Use Topics     Alcohol use: No      Wt Readings from Last 1 Encounters:   23 81.5 kg (179 lb 9.6 oz)        Anesthesia Evaluation            ROS/MED HX  ENT/Pulmonary:       Neurologic:       Cardiovascular:     (+) hypertension-----    METS/Exercise Tolerance:     Hematologic:       Musculoskeletal:       GI/Hepatic:       Renal/Genitourinary:       Endo:     (+) type II DM, Not using insulin, - not using insulin pump. not previously admitted for DM/DKA.     Psychiatric/Substance Use:       Infectious Disease:       Malignancy:       Other:               OUTSIDE LABS:  CBC:   Lab Results   Component Value Date    WBC 8.4 2009    WBC 8.5 2007    HGB 14.2 2009    HGB 15.7 2007    HCT 41.6 2009    HCT 45.4 2007     2009     2007     BMP:   Lab Results   Component Value Date     (L) 2022     2009    POTASSIUM 4.1 2022    POTASSIUM 3.6 2009    CHLORIDE 99 2022    CHLORIDE 100 2009    CO2 20 (L) 2022    CO2 27 2009    BUN 12.3 2022    BUN 10 2009    CR 0.81 2022    CR 0.88 2020      (H) 11/02/2022     (H) 11/17/2009     COAGS: No results found for: PTT, INR, FIBR  POC:   Lab Results   Component Value Date     (H) 07/10/2015     HEPATIC:   Lab Results   Component Value Date    ALBUMIN 3.9 11/02/2022    PROTTOTAL 7.3 11/02/2022    ALT 17 11/02/2022    AST 19 11/02/2022    ALKPHOS 114 11/02/2022    BILITOTAL 0.2 11/02/2022     OTHER:   Lab Results   Component Value Date    NEHEMIAS 9.3 11/02/2022    TSH 2.01 10/13/2009       Anesthesia Plan    ASA Status:  2      Anesthesia Type: MAC.     - Reason for MAC: immobility needed, straight local not clinically adequate              Consents    Anesthesia Plan(s) and associated risks, benefits, and realistic alternatives discussed. Questions answered and patient/representative(s) expressed understanding.     - Discussed: Risks, Benefits and Alternatives for BOTH SEDATION and the PROCEDURE were discussed     - Discussed with:  Patient      - Extended Intubation/Ventilatory Support Discussed: No.      - Patient is DNR/DNI Status: No    Use of blood products discussed: No .     Postoperative Care    Pain management: IV analgesics, Oral pain medications.   PONV prophylaxis: Ondansetron (or other 5HT-3), Dexamethasone or Solumedrol     Comments:                Ramy Middleton MD, MD

## 2023-03-30 NOTE — ANESTHESIA CARE TRANSFER NOTE
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    Anesthesia Type:   MAC     Note:    Oropharynx: oropharynx clear of all foreign objects  Level of Consciousness: awake        Dentition: dentition unchanged      Patient transferred to: Phase II    Handoff Report: Identifed the Patient, Identified the Reponsible Provider, Reviewed the pertinent medical history, Discussed the surgical course, Reviewed Intra-OP anesthesia mangement and issues during anesthesia, Set expectations for post-procedure period and Allowed opportunity for questions and acknowledgement of understanding      Vitals:  Vitals Value Taken Time   BP     Temp     Pulse     Resp     SpO2         Electronically Signed By: EDDIE Lemus CRNA  March 30, 2023  2:36 PM

## 2023-03-31 ENCOUNTER — OFFICE VISIT (OUTPATIENT)
Dept: OPHTHALMOLOGY | Facility: CLINIC | Age: 83
End: 2023-03-31
Attending: OPHTHALMOLOGY
Payer: COMMERCIAL

## 2023-03-31 DIAGNOSIS — H25.12 NUCLEAR SCLEROSIS, LEFT: ICD-10-CM

## 2023-03-31 DIAGNOSIS — Z96.1 PSEUDOPHAKIA, RIGHT EYE: Primary | ICD-10-CM

## 2023-03-31 PROCEDURE — 99024 POSTOP FOLLOW-UP VISIT: CPT | Performed by: OPHTHALMOLOGY

## 2023-03-31 PROCEDURE — G0463 HOSPITAL OUTPT CLINIC VISIT: HCPCS | Performed by: OPHTHALMOLOGY

## 2023-03-31 ASSESSMENT — TONOMETRY
IOP_METHOD: ICARE
OD_IOP_MMHG: 17
OS_IOP_MMHG: 15

## 2023-03-31 ASSESSMENT — VISUAL ACUITY
OS_SC: 20/60
OS_PH_SC+: -2
OD_SC+: -1
METHOD: SNELLEN - LINEAR
OS_PH_SC: 20/40
OD_SC: 20/40

## 2023-03-31 ASSESSMENT — SLIT LAMP EXAM - LIDS: COMMENTS: NORMAL

## 2023-03-31 ASSESSMENT — EXTERNAL EXAM - LEFT EYE: OS_EXAM: NORMAL

## 2023-03-31 NOTE — NURSING NOTE
Chief Complaints and History of Present Illnesses   Patient presents with     Post Op (Ophthalmology) Right Eye     Chief Complaint(s) and History of Present Illness(es)     Post Op (Ophthalmology) Right Eye            Laterality: right eye    Associated symptoms: Negative for eye pain    Pain scale: 0/10          Comments    Yesseniaclif is here one day post RE cataract surgery with IOL implant. Today he says RE feels good.    Uche Valdez COT 2:45 PM March 31, 2023

## 2023-03-31 NOTE — PROGRESS NOTES
Chief complaint   Cataract consult      HPI    Yenny Reynolds 83 year old male       Chief Complaint(s) and History of Present Illness(es)     Cataract    In right eye.  Associated symptoms include blurred vision.  Duration of months.           Comments    Pt has trouble seeing out of his RE.  This has been going on for 3 months.   Pt does not have any pain in his eye.  Both of his eyes are itchy.  No flashes or floaters in either eye.     DM2 BS: 146 today.   No results found for: A1C     JACQUI CASTELAN [facility] December 21, 2022 2:17 PM                Interval hx: no complains after surgery    Past ocular history   Prior eye surgery/laser/Trauma:phako iol right eye 3/30/23  CTL wearer:No  Glasses : -  Family Hx of eye disease: No    PMH     Past Medical History:   Diagnosis Date     Diabetes (H)      Hyperlipidaemia      Hypertension        PSH     Past Surgical History:   Procedure Laterality Date     ESOPHAGOSCOPY, GASTROSCOPY, DUODENOSCOPY (EGD), COMBINED N/A 7/10/2015    Procedure: COMBINED ESOPHAGOSCOPY, GASTROSCOPY, DUODENOSCOPY (EGD), BIOPSY SINGLE OR MULTIPLE;  Surgeon: Anthony Jack MD;  Location: [facility]     NO HISTORY OF SURGERY       PHACOEMULSIFICATION WITH STANDARD INTRAOCULAR LENS IMPLANT Right 3/30/2023    Procedure: RIGHT EYE PHACOEMULSIFICATION, CATARACT, WITH STANDARD INTRAOCULAR LENS IMPLANT INSERTION, COMPLEX CASE;  Surgeon: Devan Thornton MD;  Location: [facility]     Current Outpatient Medications   Medication     amLODIPine (NORVASC) 10 MG tablet     amLODIPine (NORVASC) 10 MG tablet     amoxicillin-clavulanate (AUGMENTIN) 875-125 MG tablet     ASPIRIN ADULT LOW STRENGTH PO     atorvastatin (LIPITOR) 40 MG tablet     omeprazole (PRILOSEC) 40 MG capsule     No current facility-administered medications for this visit.       Labs   -    Imaging   -    Drops Currently Taking   -    Assessment/Plan   #pseudophakia right eye  Good post op appearence  No eye drops      #Senile  Cataract OS   *Patient is having difficulty with daily activities due to visual impairment. Clearly told patient that cataract surgery is NOT needed if managing with current vision. Patient wishes to proceed ahead with surgery. Explained benefits alternatives and risks including but not limited to loss of vision, severe infection, retinal detachment, need for more surgery, visual disturbances etc...  Informed patient that reaching uncorrected vision aim (without glasses) after cataract surgery is not certain. Made patient aware they may need glasses, laser vision correction or in worst case scenario, IOL exchange.      Dilates well  no PXF  no Flomax  no guttae    -Aim: distance OU  - dominant eye  -Previous refractive surgery status:no  -Corneal astigmatism<1      Follow up:  Oph: after next sx  Others:  No follow-ups on file.    Attending Physician Attestation:  Complete documentation of historical and exam elements from today's encounter can be found in the full encounter summary report (not reduplicated in this progress note).  I personally obtained the chief complaint(s) and history of present illness.  I confirmed and edited as necessary the review of systems, past medical/surgical history, family history, social history, and examination findings as documented by others; and I examined the patient myself.  I personally reviewed the relevant tests, images, and reports as documented above.  I formulated and edited as necessary the assessment and plan and discussed the findings and management plan with the patient and family. - Devan Thornton MD

## 2023-04-10 ENCOUNTER — APPOINTMENT (OUTPATIENT)
Dept: INTERPRETER SERVICES | Facility: CLINIC | Age: 83
End: 2023-04-10
Payer: COMMERCIAL

## 2023-04-12 ENCOUNTER — ANESTHESIA EVENT (OUTPATIENT)
Dept: SURGERY | Facility: AMBULATORY SURGERY CENTER | Age: 83
End: 2023-04-12
Payer: COMMERCIAL

## 2023-04-12 NOTE — ANESTHESIA PREPROCEDURE EVALUATION
Anesthesia Pre-Procedure Evaluation    Patient: Yenny Reynolds   MRN: [id number] : 1940        Procedure : Procedure(s):  LEFT EYE PHACOEMULSIFICATION, CATARACT, WITH STANDARD INTRAOCULAR LENS IMPLANT INSERTION          Past Medical History:   Diagnosis Date     Diabetes (H)      Hyperlipidaemia      Hypertension       Past Surgical History:   Procedure Laterality Date     ESOPHAGOSCOPY, GASTROSCOPY, DUODENOSCOPY (EGD), COMBINED N/A 7/10/2015    Procedure: COMBINED ESOPHAGOSCOPY, GASTROSCOPY, DUODENOSCOPY (EGD), BIOPSY SINGLE OR MULTIPLE;  Surgeon: Anthony Jack MD;  Location: [facility]     NO HISTORY OF SURGERY       PHACOEMULSIFICATION WITH STANDARD INTRAOCULAR LENS IMPLANT Right 3/30/2023    Procedure: RIGHT EYE PHACOEMULSIFICATION, CATARACT, WITH STANDARD INTRAOCULAR LENS IMPLANT INSERTION, COMPLEX CASE;  Surgeon: Devan Thornton MD;  Location: [facility] OR      No Known Allergies   Social History     Tobacco Use     Smoking status: Every Day     Packs/day: 0.50     Types: Cigarettes     Smokeless tobacco: Never   Vaping Use     Vaping status: Not on file   Substance Use Topics     Alcohol use: No      Wt Readings from Last 1 Encounters:   23 82 kg (180 lb 12.4 oz)           Physical Exam    Airway        Mallampati: II   TM distance: > 3 FB   Neck ROM: full   Mouth opening: > 3 cm    Respiratory Devices and Support         Dental           Cardiovascular   cardiovascular exam normal          Pulmonary   pulmonary exam normal                OUTSIDE LABS:  CBC:   Lab Results   Component Value Date    WBC 8.4 2009    WBC 8.5 2007    HGB 14.2 2009    HGB 15.7 2007    HCT 41.6 2009    HCT 45.4 2007     2009     2007     BMP:   Lab Results   Component Value Date     (L) 2022     2009    POTASSIUM 4.1 2022    POTASSIUM 3.6 2009    CHLORIDE 99 2022    CHLORIDE 100 2009    CO2 20 (L) 2022     CO2 27 11/17/2009    BUN 12.3 11/02/2022    BUN 10 11/17/2009    CR 0.81 11/02/2022    CR 0.88 03/14/2020     (H) 03/30/2023     (H) 11/02/2022     COAGS: No results found for: PTT, INR, FIBR  POC:   Lab Results   Component Value Date     (H) 07/10/2015     HEPATIC:   Lab Results   Component Value Date    ALBUMIN 3.9 11/02/2022    PROTTOTAL 7.3 11/02/2022    ALT 17 11/02/2022    AST 19 11/02/2022    ALKPHOS 114 11/02/2022    BILITOTAL 0.2 11/02/2022     OTHER:   Lab Results   Component Value Date    NEHEMIAS 9.3 11/02/2022    TSH 2.01 10/13/2009       Anesthesia Plan    ASA Status:  2   NPO Status:  NPO Appropriate    Anesthesia Type: MAC.     - Reason for MAC: straight local not clinically adequate   Induction: Intravenous.           Consents    Anesthesia Plan(s) and associated risks, benefits, and realistic alternatives discussed. Questions answered and patient/representative(s) expressed understanding.    - Discussed:     - Discussed with:  Patient,       - Extended Intubation/Ventilatory Support Discussed: No.      - Patient is DNR/DNI Status: No    Use of blood products discussed: No .     Postoperative Care    Pain management: IV analgesics, Multi-modal analgesia, Oral pain medications.   PONV prophylaxis: Ondansetron (or other 5HT-3)     Comments:                Edgar Rondon MD

## 2023-04-13 ENCOUNTER — ANESTHESIA (OUTPATIENT)
Dept: SURGERY | Facility: AMBULATORY SURGERY CENTER | Age: 83
End: 2023-04-13
Payer: COMMERCIAL

## 2023-04-13 ENCOUNTER — HOSPITAL ENCOUNTER (OUTPATIENT)
Facility: AMBULATORY SURGERY CENTER | Age: 83
Discharge: HOME | End: 2023-04-13
Attending: OPHTHALMOLOGY
Payer: COMMERCIAL

## 2023-04-13 VITALS
OXYGEN SATURATION: 100 % | TEMPERATURE: 96.7 F | SYSTOLIC BLOOD PRESSURE: 164 MMHG | DIASTOLIC BLOOD PRESSURE: 81 MMHG | RESPIRATION RATE: 16 BRPM | HEIGHT: 67 IN | BODY MASS INDEX: 28.37 KG/M2 | WEIGHT: 180.78 LBS | HEART RATE: 76 BPM

## 2023-04-13 LAB — GLUCOSE BLDC GLUCOMTR-MCNC: 172 MG/DL (ref 70–99)

## 2023-04-13 PROCEDURE — 66984 XCAPSL CTRC RMVL W/O ECP: CPT | Mod: LT

## 2023-04-13 PROCEDURE — 66984 XCAPSL CTRC RMVL W/O ECP: CPT | Mod: 79 | Performed by: OPHTHALMOLOGY

## 2023-04-13 PROCEDURE — 82962 GLUCOSE BLOOD TEST: CPT | Performed by: PATHOLOGY

## 2023-04-13 DEVICE — LENS CC60WF 23.0 CLAREON UV ASPHERIC BICONVEX IOL: Type: IMPLANTABLE DEVICE | Site: EYE | Status: FUNCTIONAL

## 2023-04-13 RX ORDER — MOXIFLOXACIN IN NACL,ISO-OS/PF 0.3MG/0.3
SYRINGE (ML) INTRAOCULAR PRN
Status: DISCONTINUED | OUTPATIENT
Start: 2023-04-13 | End: 2023-04-13 | Stop reason: HOSPADM

## 2023-04-13 RX ORDER — FENTANYL CITRATE 50 UG/ML
50 INJECTION, SOLUTION INTRAMUSCULAR; INTRAVENOUS EVERY 5 MIN PRN
Status: DISCONTINUED | OUTPATIENT
Start: 2023-04-13 | End: 2023-04-14 | Stop reason: HOSPADM

## 2023-04-13 RX ORDER — TETRACAINE HYDROCHLORIDE 5 MG/ML
SOLUTION OPHTHALMIC PRN
Status: DISCONTINUED | OUTPATIENT
Start: 2023-04-13 | End: 2023-04-13 | Stop reason: HOSPADM

## 2023-04-13 RX ORDER — ONDANSETRON 2 MG/ML
4 INJECTION INTRAMUSCULAR; INTRAVENOUS EVERY 30 MIN PRN
Status: DISCONTINUED | OUTPATIENT
Start: 2023-04-13 | End: 2023-04-14 | Stop reason: HOSPADM

## 2023-04-13 RX ORDER — TRIAMCINOLONE ACETONIDE 40 MG/ML
INJECTION, SUSPENSION INTRA-ARTICULAR; INTRAMUSCULAR PRN
Status: DISCONTINUED | OUTPATIENT
Start: 2023-04-13 | End: 2023-04-13 | Stop reason: HOSPADM

## 2023-04-13 RX ORDER — PROPARACAINE HYDROCHLORIDE 5 MG/ML
1 SOLUTION/ DROPS OPHTHALMIC ONCE
Status: COMPLETED | OUTPATIENT
Start: 2023-04-13 | End: 2023-04-13

## 2023-04-13 RX ORDER — SODIUM CHLORIDE, SODIUM LACTATE, POTASSIUM CHLORIDE, CALCIUM CHLORIDE 600; 310; 30; 20 MG/100ML; MG/100ML; MG/100ML; MG/100ML
INJECTION, SOLUTION INTRAVENOUS CONTINUOUS
Status: DISCONTINUED | OUTPATIENT
Start: 2023-04-13 | End: 2023-04-14 | Stop reason: HOSPADM

## 2023-04-13 RX ORDER — BALANCED SALT SOLUTION 6.4; .75; .48; .3; 3.9; 1.7 MG/ML; MG/ML; MG/ML; MG/ML; MG/ML; MG/ML
SOLUTION OPHTHALMIC PRN
Status: DISCONTINUED | OUTPATIENT
Start: 2023-04-13 | End: 2023-04-13 | Stop reason: HOSPADM

## 2023-04-13 RX ORDER — LIDOCAINE 40 MG/G
CREAM TOPICAL
Status: DISCONTINUED | OUTPATIENT
Start: 2023-04-13 | End: 2023-04-14 | Stop reason: HOSPADM

## 2023-04-13 RX ORDER — ACETAMINOPHEN 325 MG/1
975 TABLET ORAL ONCE
Status: COMPLETED | OUTPATIENT
Start: 2023-04-13 | End: 2023-04-13

## 2023-04-13 RX ORDER — CYCLOPENTOLAT/TROPIC/PHENYLEPH 1%-1%-2.5%
1 DROPS (EA) OPHTHALMIC (EYE)
Status: COMPLETED | OUTPATIENT
Start: 2023-04-13 | End: 2023-04-13

## 2023-04-13 RX ORDER — FENTANYL CITRATE 50 UG/ML
25 INJECTION, SOLUTION INTRAMUSCULAR; INTRAVENOUS EVERY 5 MIN PRN
Status: DISCONTINUED | OUTPATIENT
Start: 2023-04-13 | End: 2023-04-14 | Stop reason: HOSPADM

## 2023-04-13 RX ORDER — LIDOCAINE HYDROCHLORIDE 10 MG/ML
INJECTION, SOLUTION EPIDURAL; INFILTRATION; INTRACAUDAL; PERINEURAL PRN
Status: DISCONTINUED | OUTPATIENT
Start: 2023-04-13 | End: 2023-04-13 | Stop reason: HOSPADM

## 2023-04-13 RX ORDER — ONDANSETRON 4 MG/1
4 TABLET, ORALLY DISINTEGRATING ORAL EVERY 30 MIN PRN
Status: DISCONTINUED | OUTPATIENT
Start: 2023-04-13 | End: 2023-04-14 | Stop reason: HOSPADM

## 2023-04-13 RX ORDER — FENTANYL CITRATE 50 UG/ML
INJECTION, SOLUTION INTRAMUSCULAR; INTRAVENOUS PRN
Status: DISCONTINUED | OUTPATIENT
Start: 2023-04-13 | End: 2023-04-13

## 2023-04-13 RX ORDER — LABETALOL HYDROCHLORIDE 5 MG/ML
10 INJECTION, SOLUTION INTRAVENOUS
Status: DISCONTINUED | OUTPATIENT
Start: 2023-04-13 | End: 2023-04-14 | Stop reason: HOSPADM

## 2023-04-13 RX ORDER — DICLOFENAC SODIUM 1 MG/ML
1 SOLUTION/ DROPS OPHTHALMIC
Status: COMPLETED | OUTPATIENT
Start: 2023-04-13 | End: 2023-04-13

## 2023-04-13 RX ADMIN — FENTANYL CITRATE 25 MCG: 50 INJECTION, SOLUTION INTRAMUSCULAR; INTRAVENOUS at 09:06

## 2023-04-13 RX ADMIN — Medication 1 DROP: at 07:40

## 2023-04-13 RX ADMIN — SODIUM CHLORIDE, SODIUM LACTATE, POTASSIUM CHLORIDE, CALCIUM CHLORIDE: 600; 310; 30; 20 INJECTION, SOLUTION INTRAVENOUS at 07:49

## 2023-04-13 RX ADMIN — DICLOFENAC SODIUM 1 DROP: 1 SOLUTION/ DROPS OPHTHALMIC at 07:40

## 2023-04-13 RX ADMIN — ACETAMINOPHEN 975 MG: 325 TABLET ORAL at 07:39

## 2023-04-13 RX ADMIN — Medication 1 DROP: at 07:48

## 2023-04-13 RX ADMIN — PROPARACAINE HYDROCHLORIDE 1 DROP: 5 SOLUTION/ DROPS OPHTHALMIC at 07:39

## 2023-04-13 RX ADMIN — DICLOFENAC SODIUM 1 DROP: 1 SOLUTION/ DROPS OPHTHALMIC at 07:48

## 2023-04-13 RX ADMIN — Medication 1 DROP: at 07:52

## 2023-04-13 RX ADMIN — DICLOFENAC SODIUM 1 DROP: 1 SOLUTION/ DROPS OPHTHALMIC at 07:52

## 2023-04-13 NOTE — OP NOTE
Operative  Report    Patient Name: Yenny Reynolds    MRN: [id number]    Date of Surgery: 4/13/2023    Surgeon: kaushal roblero M.D    Assistant surgeon: Morgan Roberto MD    Preoperative Diagnosis: Visually significant cataract, left eye    Postoperative Diagnosis: Same    Procedure: Phacoemulsification with intraocular lens implant, left eye    Implant: AIM emmetropia distance  Implant Name Type Inv. Item Serial No.  Lot No. LRB No. Used Action   LENS CC60WF.230 CL[employer] UV ASPHERIC BICONVEX IOL - [id number] Lens/Eye Implant LENS CC60WF.230 CL[employer] UV ASPHERIC BICONVEX IOL [id number] [employer]  Left 1 Implanted       Anesthesia Type: MAC with local    Estimated Blood Loss: Trace    Complications: None    INDICATIONS FOR PROCEDURE: Patient has a history of visually significant cataract affecting the patient's activity of daily living. After a discussion with the patient, the patient has elected to have the listed procedure(s) to maximize visual potential. All of the appropriate consent forms have been signed and all of the patient's questions have been answered.    DETAILS OF THE PROCEDURE: Patient was identified in the pre operative area and given pre operative eye drops. The patient was then brought into the operating room and intravenous sedation was begun after a time out was performed. The patient was prepped and draped in the usual sterile ophthalmic fashion.     A lid speculum was placed and the operating microscope was brought into position. A paracentesis was made and lidocain and viscoelastic was injected into the anterior chamber. A clear corneal incision was made using a keratome blade. A cystotome needle and Utrata forceps were used to create an anterior continuous curvilinear capsulorhexis. Then BSS on a blunt tipped cannula was used for hydrodissection and hydrodelineation. Using the phacoemulsification unit, the nucleus was then removed  from the eye. Using irrigation and aspiration, the remaining cortical material was removed from the eye. The capsular bag was infused with viscoelastic material. The intraocular lens was then placed into the capsular bag and secured into position. The remaining viscoelastic material was then removed from the eye via irrigation and aspiration. BSS on a blunt tipped cannula was used to hydrate all of the wounds. At the end of the case, the wounds were noted to be watertight, the pupil was noted to be round, the lens appeared to be in good position, and the pressure was palpated to be physiologic. Intracameral moxifloxacin and A subconjunctival injection of Kenalog were administered.     The eye was patched and shielded. Patient tolerated procedure and was brought to the recover area in good condition. Patient will follow in the eye clinic in one day.       Morgan Roberto MD  Ophthalmology, PGY-3

## 2023-04-13 NOTE — BRIEF OP NOTE
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  Complications: None.  Implants:   Implant Name Type Inv. Item Serial No.  Lot No. LRB No. Used Action   LENS CC60WF.230 CLAREON UV ASPHERIC BICONVEX IOL - [id number] Lens/Eye Implant LENS CC60WF.230 CL[employer] UV ASPHERIC BICONVEX IOL [id number] [employer]  Left 1 Implanted

## 2023-04-13 NOTE — DISCHARGE INSTRUCTIONS
Post-Operative Instructions     FIRST 24 HOURS AFTER SURGERY:  You are allowed to Tylenol (acetaminophen) 650mg every four hours as needed for pain unless you have liver disease or are allergic to Tylenol..  Continue taking your regular medications and eye drops in the non-operative eye.  Do not remove the metal or plastic shield unless otherwise instructed by your surgeon.  Do not operate a car, motorcycle, or machinery for 24 hours after surgery.  Call ED immediately if you have SEVERE PAIN unrelieved with Tylenol. And  ask for the resident on call.     MEDICATION INSTRUCTIONS:  -You will not have treatment eye drops prescription as medications were injected into your eye.  -If you feel your eyes are dry and itchy, you can use regular lubricating drops for one month after the surgery. These eye drops can be found over the counter Brand names example: Systane, Refresh. Please choose preservative free drops. To be used four times a day and as needed for 1 month  -Instilling the eye drops directly into the eye.    -If you had previously any glaucoma eye drops, You can remove the cover and instill the drops as prescribed before and after the procedure      GENERAL INSTRUCTIONS:  Hygiene of the operated eye  Wash your hands thoroughly before caring for the eye.  If the lids are sticky or itchy in the morning, debris, or matter can be gently wiped away with a cotton ball moistened with tap water. DO NOT press on the lids or eyeball.     FIVE MINUTES APART. If ointment is prescribed, it should be applied last.  If you have been taking medications in the non-operated eye, continue as they were prescribed.  If you take medications by mouth for a medical problem, continue as they were prescribed (unless otherwise instructed by physician)    EYE PROTECTION  From the time of surgery until the time of your post-operative day 1 appointment, wear the eye shield at all times. Then, wear it whenever sleeping, for 1  week.  Protective sunglasses may be worn as needed for your comfort, and are suggested for outdoor activities.    ACTIVITIES  Avoid vigorous exertion and heavy lifting for the first week after surgery  You may take a bath or shower, but avoid getting water directly into your eye for one week after surgery, usually by keeping your eyes closed during the bath.  You should discuss driving and traveling with your surgeon. You may ride in a car and fly in an airplane unless otherwise instructed.  Avoid swimming or using a hot tube/sauna/pool/lake for 2 weeks after the surgery.      WHAT TO EXPECT:  Mild irritation and discomfort are normal.    Call the doctor if you experience any of the following:  Severe eye pain  Nausea  Vomiting  Severe headache  [facility]  Home Care Following Anesthesia  For 24 hours after surgery:  Get plenty of rest.  A responsible adult must stay with you for at least 24 hours after you leave the surgery center.  Do not drive or use heavy equipment.  If you have weakness or tingling, don't drive or use heavy equipment until this feeling goes away.   Do not drink alcohol.   Avoid strenuous or risky activities.  Ask for help when climbing stairs.  You may feel lightheaded.  IF so, sit for a few minutes before standing.  Have someone help you get up.   If you have nausea (feel sick to your stomach): Drink only clear liquids such as apple juice, ginger ale, broth or 7-Up.  Rest may also help.  Be sure to drink enough fluids.  Move to a regular diet as you feel able.   You may have a slight fever.  Call the doctor if your fever is over 100 F (37.7 C) (taken under the tongue) or lasts longer than 24 hours.  You may have a dry mouth, a sore throat, muscle aches or trouble sleeping. These should go away after 24 hours.  Do not make important or legal decisions.   It is recommended to avoid smoking.               Tips for taking pain medications  To get the best pain  relief possible, remember these points:  Take pain medications as directed, before pain becomes severe.  Pain medication can upset your stomach: taking it with food may help.  Constipation is a common side effect of pain medication. Drink plenty of  fluids.  Eat foods high in fiber. Take a stool softener if recommended by your doctor or pharmacist.  Do not drink alcohol, drive or operate machinery while taking pain medications.  Ask about other ways to control pain, such as with heat, ice or relaxation.    Tylenol/Acetaminophen Consumption  To help encourage the safe use of acetaminophen, the makers of TYLENOL  have lowered the maximum daily dose for single-ingredient Extra Strength TYLENOL  (acetaminophen) products sold in the [place] from 8 pills per day (4,000 mg) to 6 pills per day (3,000 mg). The dosing interval has also changed from 2 pills every 4-6 hours to 2 pills every 6 hours.  If you feel your pain relief is insufficient, you may take Tylenol/Acetaminophen in addition to your narcotic pain medication.   Be careful not to exceed 3,000 mg of Tylenol/Acetaminophen in a 24 hour period from all sources.  If you are taking extra strength Tylenol/acetaminophen (500 mg), the maximum dose is 6 tablets in 24 hours.  If you are taking regular strength acetaminophen (325 mg), the maximum dose is 9 tablets in 24 hours.    Call a doctor for any of the following:  Signs of infection (fever, growing tenderness at the surgery site, a large amount of drainage or bleeding, severe pain, foul-smelling drainage, redness, swelling).  It has been over 8 to 10 hours since surgery and you are still not able to urinate (pass water).  Headache for over 24 hours.  Numbness, tingling or weakness the day after surgery (if you had spinal anesthesia).  Signs of Covid-19 infection (temperature over 100 degrees, shortness of breath, cough, loss of taste/smell, generalized body aches, persistent headache, chills, sore throat,  nausea/vomiting/diarrhea)  Your doctor is:  Dr. Devan Thornton, Ophthalmology: [phone]                    Or dial [phone] and ask for the resident on call for:  Ophthalmology  For emergency care, call the:  [facility]:  [phone] (TTY for hearing impaired: [phone])

## 2023-04-13 NOTE — ANESTHESIA CARE TRANSFER NOTE
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    Anesthesia Type:   MAC     Note:    Oropharynx: oropharynx clear of all foreign objects and spontaneously breathing  Level of Consciousness: awake  Oxygen Supplementation: room air    Independent Airway: airway patency satisfactory and stable  Dentition: dentition unchanged  Vital Signs Stable: post-procedure vital signs reviewed and stable  Report to RN Given: handoff report given  Patient transferred to: Phase II    Handoff Report: Identifed the Patient, Identified the Reponsible Provider, Reviewed the pertinent medical history, Discussed the surgical course, Reviewed Intra-OP anesthesia mangement and issues during anesthesia, Set expectations for post-procedure period and Allowed opportunity for questions and acknowledgement of understanding      Vitals:  Vitals Value Taken Time   /79 04/13/23 0933   Temp 36.2  C (97.2  F) 04/13/23 0933   Pulse 83 04/13/23 0933   Resp 16 04/13/23 0933   SpO2 100 % 04/13/23 0933       Electronically Signed By: EDDIE Townsend CRNA  April 13, 2023  9:34 AM

## 2023-04-14 ENCOUNTER — OFFICE VISIT (OUTPATIENT)
Dept: OPHTHALMOLOGY | Facility: CLINIC | Age: 83
End: 2023-04-14
Attending: OPHTHALMOLOGY
Payer: COMMERCIAL

## 2023-04-14 DIAGNOSIS — Z96.1 PSEUDOPHAKIA: Primary | ICD-10-CM

## 2023-04-14 PROCEDURE — 99207 PR SERVICE NOT STAFFED W/SUPERV PROV: CPT | Mod: GC | Performed by: OPHTHALMOLOGY

## 2023-04-14 ASSESSMENT — VISUAL ACUITY
OS_SC: 20/30
OS_SC+: -2
OD_SC: 20/20
METHOD: SNELLEN - LINEAR

## 2023-04-14 ASSESSMENT — EXTERNAL EXAM - LEFT EYE: OS_EXAM: NORMAL

## 2023-04-14 ASSESSMENT — TONOMETRY
OS_IOP_MMHG: 15
OD_IOP_MMHG: 15
IOP_METHOD: ICARE

## 2023-04-14 ASSESSMENT — SLIT LAMP EXAM - LIDS: COMMENTS: NORMAL

## 2023-04-14 NOTE — PROGRESS NOTES
Chief complaint   Post op visit      HPI    Yenny Reynolds 83 year old male       Chief Complaint(s) and History of Present Illness(es)     Cataract    In right eye.  Associated symptoms include blurred vision.  Duration of months.           Comments    Pt has trouble seeing out of his RE.  This has been going on for 3 months.   Pt does not have any pain in his eye.  Both of his eyes are itchy.  No flashes or floaters in either eye.     DM2 BS: 146 today.   No results found for: A1C     JACQUI CASTELAN [facility] December 21, 2022 2:17 PM                  Interval hx: Feels well, notes mild itching in both eyes today. No flashes, floaters, curtains. Feels that his vision is quite clear.    Past ocular history   Prior eye surgery/laser/Trauma:phako iol right eye 3/30/23 left eye 4/13/ CTL wearer  Glasses : -  Family Hx of eye disease: No    PMH     Past Medical History:   Diagnosis Date     Diabetes (H)      Hyperlipidaemia      Hypertension        PSH     Past Surgical History:   Procedure Laterality Date     ESOPHAGOSCOPY, GASTROSCOPY, DUODENOSCOPY (EGD), COMBINED N/A 7/10/2015    Procedure: COMBINED ESOPHAGOSCOPY, GASTROSCOPY, DUODENOSCOPY (EGD), BIOPSY SINGLE OR MULTIPLE;  Surgeon: Anthony Jack MD;  Location:  GI     NO HISTORY OF SURGERY       PHACOEMULSIFICATION WITH STANDARD INTRAOCULAR LENS IMPLANT Right 3/30/2023    Procedure: RIGHT EYE PHACOEMULSIFICATION, CATARACT, WITH STANDARD INTRAOCULAR LENS IMPLANT INSERTION, COMPLEX CASE;  Surgeon: Devan Thornton MD;  Location: [facility] [place]     PHACOEMULSIFICATION WITH STANDARD INTRAOCULAR LENS IMPLANT Left 4/13/2023    Procedure: LEFT EYE PHACOEMULSIFICATION, CATARACT, WITH STANDARD INTRAOCULAR LENS IMPLANT INSERTION;  Surgeon: Devan Thornton MD;  Location: [facility] [place] OR       [facility]     Current Outpatient Medications   Medication     amLODIPine (NORVASC) 10 MG tablet     amLODIPine (NORVASC) 10 MG tablet     amoxicillin-clavulanate (AUGMENTIN) 875-125 MG tablet      ASPIRIN ADULT LOW STRENGTH PO     atorvastatin (LIPITOR) 40 MG tablet     omeprazole (PRILOSEC) 40 MG capsule     No current facility-administered medications for this visit.       Labs   -    Imaging   -    Drops Currently Taking   -    Assessment/Plan   #pseudophakia right eye  Good post op appearence  No eye drops    #Pseudophakia left eye  - POD1 s/p CE/IOL  - Good post op appearance  - Intracameral moxifloxacin and subconjunctival kenalog administered at time of surgery  - 10-0 nylon suture in main wound, plan to remove at next visit  - RD/endophthalmitis precautions discussed  - Shield for 1 week  - No water in the eye; no eye rubbing    Seen without Dr. Thornton    Follow up:  Oph: POM1, sooner as needed  Others:  No follow-ups on file.     Kendall Roger MD  Fellow, Cornea, External Disease and Refractive Surgery  Department of Ophthalmology  [facility]    I agree with the plan. I did not examine the patient today  Devan Thornton M.D

## 2023-05-05 ENCOUNTER — OFFICE VISIT (OUTPATIENT)
Dept: OPHTHALMOLOGY | Facility: CLINIC | Age: 83
End: 2023-05-05
Attending: OPHTHALMOLOGY
Payer: COMMERCIAL

## 2023-05-05 DIAGNOSIS — Z96.1 PSEUDOPHAKIA: Primary | ICD-10-CM

## 2023-05-05 PROCEDURE — G0463 HOSPITAL OUTPT CLINIC VISIT: HCPCS | Performed by: OPHTHALMOLOGY

## 2023-05-05 PROCEDURE — 99024 POSTOP FOLLOW-UP VISIT: CPT | Mod: GC | Performed by: OPHTHALMOLOGY

## 2023-05-05 ASSESSMENT — VISUAL ACUITY
OD_SC+: -
OS_SC: 20/25
OS_SC+: -2
METHOD: NUMBERS - LINEAR
OD_SC: 20/25

## 2023-05-05 ASSESSMENT — REFRACTION_MANIFEST
OD_CYLINDER: +0.75
OD_AXIS: 003
OS_ADD: +2.50
OD_SPHERE: -0.25
OS_SPHERE: -0.25
OD_ADD: +2.50
OS_AXIS: 150
OS_CYLINDER: +1.00

## 2023-05-05 ASSESSMENT — SLIT LAMP EXAM - LIDS
COMMENTS: NORMAL
COMMENTS: NORMAL

## 2023-05-05 ASSESSMENT — TONOMETRY
IOP_METHOD: TONOPEN
OS_IOP_MMHG: 16
OD_IOP_MMHG: 17

## 2023-05-05 ASSESSMENT — CUP TO DISC RATIO
OD_RATIO: 0.75
OS_RATIO: 0.65

## 2023-05-05 ASSESSMENT — EXTERNAL EXAM - RIGHT EYE: OD_EXAM: NORMAL

## 2023-05-05 ASSESSMENT — EXTERNAL EXAM - LEFT EYE: OS_EXAM: NORMAL

## 2023-05-05 NOTE — NURSING NOTE
Chief Complaints and History of Present Illnesses   Patient presents with     Post Op (Ophthalmology) Both Eyes     Cataract extraction with IOL in the right eye on 03/30/2023  Cataract extraction with IOL in the left eye on 04/13/2023     Chief Complaint(s) and History of Present Illness(es)     Post Op (Ophthalmology) Both Eyes            Laterality: both eyes    Course: gradually improving    Associated symptoms: eye pain (left eye, mild, intermittent) and itching (both eyes)    Treatments tried: no treatments    Pain scale: 5/10    Comments: Cataract extraction with IOL in the right eye on 03/30/2023  Cataract extraction with IOL in the left eye on 04/13/2023          Comments    Exam interpreted via Ipad Kazakh/English.  Patient returns to clinic for a bilateral post operative eye exam.  He is having some itching in both eyes and eye pain in his left eye.    Jenelle Smith, COT 3:24 PM  May 5, 2023

## 2023-05-05 NOTE — PROGRESS NOTES
Chief complaint   Post op visit      HPI    Yenny Reynolds 83 year old male       Chief Complaint(s) and History of Present Illness(es)     Cataract    In right eye.  Associated symptoms include blurred vision.  Duration of months.           Comments    Pt has trouble seeing out of his RE.  This has been going on for 3 months.   Pt does not have any pain in his eye.  Both of his eyes are itchy.  No flashes or floaters in either eye.     DM2 BS: 146 today.   No results found for: A1C     JACQUI CASTELAN [facility] December 21, 2022 2:17 PM                  Interval hx: Feels well, though notes some left eye dryness and itching. No flashes, floaters, curtains. Feels that his vision is quite clear.    Past ocular history   Prior eye surgery/laser/Trauma:phako iol right eye 3/30/23 left eye 4/13/ CTL wearer  Glasses : -  Family Hx of eye disease: No    PMH     Past Medical History:   Diagnosis Date     Diabetes (H)      Hyperlipidaemia      Hypertension        PSH     Past Surgical History:   Procedure Laterality Date     ESOPHAGOSCOPY, GASTROSCOPY, DUODENOSCOPY (EGD), COMBINED N/A 7/10/2015    Procedure: COMBINED ESOPHAGOSCOPY, GASTROSCOPY, DUODENOSCOPY (EGD), BIOPSY SINGLE OR MULTIPLE;  Surgeon: Anthony Jack MD;  Location:  GI     NO HISTORY OF SURGERY       PHACOEMULSIFICATION WITH STANDARD INTRAOCULAR LENS IMPLANT Right 3/30/2023    Procedure: RIGHT EYE PHACOEMULSIFICATION, CATARACT, WITH STANDARD INTRAOCULAR LENS IMPLANT INSERTION, COMPLEX CASE;  Surgeon: Devan Thornton MD;  [place] [facility]     PHACOEMULSIFICATION WITH STANDARD INTRAOCULAR LENS IMPLANT Left 4/13/2023    Procedure: LEFT EYE PHACOEMULSIFICATION, CATARACT, WITH STANDARD INTRAOCULAR LENS IMPLANT INSERTION;  Surgeon: Devan Thornton MD;  [place] [facility]       [facility]     Current Outpatient Medications   Medication     amLODIPine (NORVASC) 10 MG tablet     amLODIPine (NORVASC) 10 MG tablet     amoxicillin-clavulanate (AUGMENTIN) 875-125 MG  tablet     ASPIRIN ADULT LOW STRENGTH PO     atorvastatin (LIPITOR) 40 MG tablet     omeprazole (PRILOSEC) 40 MG capsule     No current facility-administered medications for this visit.       Labs   -    Imaging   -    Drops Currently Taking   -    Assessment/Plan   #pseudophakia both eyes  - excellent post-op appearance  - Happy with distance vision without correct  - recommended OTC readers  - Mild ocular surface disease; will start artificial tears QID  - RTC 3 months for symptom check    Follow up:  Oph: 3 months sooner as needed  Others:    Kendall Roger MD  Fellow, Cornea, External Disease and Refractive Surgery  Department of Ophthalmology  [facility]  Attending Physician Attestation:  Complete documentation of historical and exam elements from today's encounter can be found in the full encounter summary report (not reduplicated in this progress note).  I personally obtained the chief complaint(s) and history of present illness.  I confirmed and edited as necessary the review of systems, past medical/surgical history, family history, social history, and examination findings as documented by others; and I examined the patient myself.  I personally reviewed the relevant tests, images, and reports as documented above.  I formulated and edited as necessary the assessment and plan and discussed the findings and management plan with the patient and family. - Devan Thornton MD

## 2023-06-27 NOTE — ANESTHESIA POSTPROCEDURE EVALUATION
Patient: Yenny Reynolds    Procedure: Procedure(s):  LEFT EYE PHACOEMULSIFICATION, CATARACT, WITH STANDARD INTRAOCULAR LENS IMPLANT INSERTION       Anesthesia Type:  MAC    Note:  Disposition: Outpatient   Postop Pain Control: Uneventful            Sign Out: Well controlled pain   PONV: No   Neuro/Psych: Uneventful            Sign Out: Acceptable/Baseline neuro status   Airway/Respiratory: Uneventful            Sign Out: Acceptable/Baseline resp. status   CV/Hemodynamics: Uneventful            Sign Out: Acceptable CV status; No obvious hypovolemia; No obvious fluid overload   Other NRE:    DID A NON-ROUTINE EVENT OCCUR?            Last vitals:  Vitals Value Taken Time   /81 04/13/23 0944   Temp 35.9  C (96.7  F) 04/13/23 0944   Pulse 76 04/13/23 0944   Resp 16 04/13/23 0944   SpO2 100 % 04/13/23 0944       Electronically Signed By: Edgar Rondon MD  April 13, 2023  10:48 AM   Admission Reconciliation is Completed  Discharge Reconciliation is Completed

## 2023-07-28 ENCOUNTER — TRANSCRIBE ORDERS (OUTPATIENT)
Dept: OTHER | Age: 83
End: 2023-07-28

## 2023-07-28 DIAGNOSIS — H26.9 CATARACT OF RIGHT EYE, UNSPECIFIED CATARACT TYPE: Primary | ICD-10-CM

## 2023-11-22 ENCOUNTER — LAB REQUISITION (OUTPATIENT)
Dept: LAB | Facility: CLINIC | Age: 83
End: 2023-11-22
Payer: COMMERCIAL

## 2023-11-22 DIAGNOSIS — E11.9 TYPE 2 DIABETES MELLITUS WITHOUT COMPLICATIONS (H): ICD-10-CM

## 2023-11-22 LAB
CREAT UR-MCNC: 64.1 MG/DL
MICROALBUMIN UR-MCNC: 98.4 MG/L
MICROALBUMIN/CREAT UR: 153.51 MG/G CR (ref 0–17)

## 2023-11-22 PROCEDURE — 82570 ASSAY OF URINE CREATININE: CPT | Mod: ORL | Performed by: INTERNAL MEDICINE

## 2024-05-02 ENCOUNTER — LAB REQUISITION (OUTPATIENT)
Dept: LAB | Facility: CLINIC | Age: 84
End: 2024-05-02
Payer: COMMERCIAL

## 2024-05-02 DIAGNOSIS — E11.9 TYPE 2 DIABETES MELLITUS WITHOUT COMPLICATIONS (H): ICD-10-CM

## 2024-05-02 PROCEDURE — 80061 LIPID PANEL: CPT | Mod: ORL | Performed by: INTERNAL MEDICINE

## 2024-05-04 LAB
CHOLEST SERPL-MCNC: 215 MG/DL
FASTING STATUS PATIENT QL REPORTED: NO
HDLC SERPL-MCNC: 46 MG/DL
LDLC SERPL CALC-MCNC: 109 MG/DL
NONHDLC SERPL-MCNC: 169 MG/DL
TRIGL SERPL-MCNC: 300 MG/DL

## 2024-11-07 ENCOUNTER — MEDICAL CORRESPONDENCE (OUTPATIENT)
Dept: HEALTH INFORMATION MANAGEMENT | Facility: CLINIC | Age: 84
End: 2024-11-07
Payer: COMMERCIAL

## 2024-11-07 ENCOUNTER — LAB REQUISITION (OUTPATIENT)
Dept: LAB | Facility: CLINIC | Age: 84
End: 2024-11-07
Payer: COMMERCIAL

## 2024-11-07 DIAGNOSIS — E11.9 TYPE 2 DIABETES MELLITUS WITHOUT COMPLICATIONS (H): ICD-10-CM

## 2024-11-11 ENCOUNTER — TRANSCRIBE ORDERS (OUTPATIENT)
Dept: OTHER | Age: 84
End: 2024-11-11

## 2024-11-11 DIAGNOSIS — E11.9 TYPE 2 DIABETES MELLITUS WITHOUT COMPLICATION, WITHOUT LONG-TERM CURRENT USE OF INSULIN (H): Primary | ICD-10-CM

## 2024-12-16 ENCOUNTER — LAB REQUISITION (OUTPATIENT)
Dept: LAB | Facility: CLINIC | Age: 84
End: 2024-12-16
Payer: COMMERCIAL

## 2024-12-16 DIAGNOSIS — R00.2 PALPITATIONS: ICD-10-CM

## 2024-12-16 PROCEDURE — 80076 HEPATIC FUNCTION PANEL: CPT | Mod: ORL | Performed by: INTERNAL MEDICINE

## 2024-12-16 PROCEDURE — 84443 ASSAY THYROID STIM HORMONE: CPT | Mod: ORL | Performed by: INTERNAL MEDICINE

## 2024-12-17 ENCOUNTER — TRANSCRIBE ORDERS (OUTPATIENT)
Dept: OTHER | Age: 84
End: 2024-12-17

## 2024-12-17 DIAGNOSIS — R00.2 PALPITATIONS: Primary | ICD-10-CM

## 2024-12-17 LAB
ALBUMIN SERPL BCG-MCNC: 4 G/DL (ref 3.5–5.2)
ALP SERPL-CCNC: 118 U/L (ref 40–150)
ALT SERPL W P-5'-P-CCNC: 18 U/L (ref 0–70)
AST SERPL W P-5'-P-CCNC: 18 U/L (ref 0–45)
BILIRUB DIRECT SERPL-MCNC: <0.2 MG/DL (ref 0–0.3)
BILIRUB SERPL-MCNC: 0.4 MG/DL
PROT SERPL-MCNC: 7.2 G/DL (ref 6.4–8.3)
TSH SERPL DL<=0.005 MIU/L-ACNC: 1.95 UIU/ML (ref 0.3–4.2)

## 2024-12-19 ENCOUNTER — LAB REQUISITION (OUTPATIENT)
Dept: LAB | Facility: CLINIC | Age: 84
End: 2024-12-19
Payer: COMMERCIAL

## 2024-12-19 DIAGNOSIS — E11.9 TYPE 2 DIABETES MELLITUS WITHOUT COMPLICATIONS (H): ICD-10-CM

## 2024-12-19 LAB
CREAT UR-MCNC: 28.9 MG/DL
MICROALBUMIN UR-MCNC: 38 MG/L
MICROALBUMIN/CREAT UR: 131.49 MG/G CR (ref 0–17)

## 2024-12-19 PROCEDURE — 82043 UR ALBUMIN QUANTITATIVE: CPT | Mod: ORL | Performed by: INTERNAL MEDICINE

## (undated) DEVICE — EYE CANN IRR 25GA CYSTOTOME 581610

## (undated) DEVICE — PACK CATARACT CUSTOM ASC SEY15CPUMC

## (undated) DEVICE — GLOVE BIOGEL PI ULTRATOUCH G SZ 8.0 42180

## (undated) DEVICE — LINEN TOWEL PACK X5 5464

## (undated) DEVICE — EYE CANN IRR 27GA ANTERIOR CHAMBER 581280

## (undated) DEVICE — EYE KNIFE STILETTO VISITEC 1.1MM ANG 45DEG SIDEPORT 376620

## (undated) DEVICE — EYE TIP IRRIGATION & ASPIRATION POLYMER 35D BENT 8065751511

## (undated) DEVICE — SOL WATER IRRIG 500ML BOTTLE 2F7113

## (undated) DEVICE — EYE KNIFE SLIT XSTAR VISITEC 2.4MM 45DEG BEVEL UP 373724

## (undated) DEVICE — EYE PACK CUSTOM ANTERIOR 30DEG TIP CENTURION PPK6682-04

## (undated) DEVICE — EYE SHIELD PLASTIC

## (undated) RX ORDER — ACETAMINOPHEN 325 MG/1
TABLET ORAL
Status: DISPENSED
Start: 2023-03-30

## (undated) RX ORDER — FENTANYL CITRATE 50 UG/ML
INJECTION, SOLUTION INTRAMUSCULAR; INTRAVENOUS
Status: DISPENSED
Start: 2023-03-30

## (undated) RX ORDER — FENTANYL CITRATE 50 UG/ML
INJECTION, SOLUTION INTRAMUSCULAR; INTRAVENOUS
Status: DISPENSED
Start: 2023-04-13